# Patient Record
Sex: FEMALE | URBAN - METROPOLITAN AREA
[De-identification: names, ages, dates, MRNs, and addresses within clinical notes are randomized per-mention and may not be internally consistent; named-entity substitution may affect disease eponyms.]

---

## 2017-01-05 ASSESSMENT — ENCOUNTER SYMPTOMS
SINUS CONGESTION: 1
HEADACHES: 1
STIFFNESS: 0
TINGLING: 0
POOR WOUND HEALING: 0
POLYPHAGIA: 0
JOINT SWELLING: 1
NUMBNESS: 0
DECREASED LIBIDO: 1
LEG SWELLING: 1
DIZZINESS: 0
LOSS OF CONSCIOUSNESS: 0
MUSCLE CRAMPS: 0
BACK PAIN: 0
MYALGIAS: 0
SYNCOPE: 0
SLEEP DISTURBANCES DUE TO BREATHING: 0
ARTHRALGIAS: 1
SWOLLEN GLANDS: 0
LIGHT-HEADEDNESS: 0
DECREASED CONCENTRATION: 0
EYE WATERING: 1
LEG PAIN: 0
SEIZURES: 0
EYE IRRITATION: 0
FATIGUE: 0
EYE PAIN: 0
NECK MASS: 0
INSOMNIA: 1
EYE REDNESS: 0
POLYDIPSIA: 0
HALLUCINATIONS: 0
PARALYSIS: 0
WEAKNESS: 0
SORE THROAT: 1
HYPERTENSION: 0
HOARSE VOICE: 0
EXERCISE INTOLERANCE: 1
TROUBLE SWALLOWING: 0
ORTHOPNEA: 0
SINUS PAIN: 0
PANIC: 0
CLAUDICATION: 0
DISTURBANCES IN COORDINATION: 0
HYPOTENSION: 0
HEMATURIA: 0
WEIGHT GAIN: 0
MEMORY LOSS: 0
PALPITATIONS: 1
FLANK PAIN: 0
BRUISES/BLEEDS EASILY: 1
NERVOUS/ANXIOUS: 0
NIGHT SWEATS: 1
TREMORS: 0
HOT FLASHES: 0
SMELL DISTURBANCE: 0
DIFFICULTY URINATING: 0
TACHYCARDIA: 0
CHILLS: 1
TASTE DISTURBANCE: 0
WEIGHT LOSS: 1
SKIN CHANGES: 0
DOUBLE VISION: 0
NECK PAIN: 1
DYSURIA: 0
INCREASED ENERGY: 0
SPEECH CHANGE: 0
FEVER: 0
NAIL CHANGES: 0
ALTERED TEMPERATURE REGULATION: 1
DEPRESSION: 0
DECREASED APPETITE: 0

## 2017-01-18 ENCOUNTER — CARE COORDINATION (OUTPATIENT)
Dept: ONCOLOGY | Facility: CLINIC | Age: 52
End: 2017-01-18

## 2017-01-18 ENCOUNTER — ONCOLOGY VISIT (OUTPATIENT)
Dept: ONCOLOGY | Facility: CLINIC | Age: 52
End: 2017-01-18
Attending: INTERNAL MEDICINE
Payer: COMMERCIAL

## 2017-01-18 VITALS
TEMPERATURE: 96 F | OXYGEN SATURATION: 98 % | DIASTOLIC BLOOD PRESSURE: 70 MMHG | WEIGHT: 114 LBS | BODY MASS INDEX: 18.32 KG/M2 | RESPIRATION RATE: 12 BRPM | SYSTOLIC BLOOD PRESSURE: 107 MMHG | HEART RATE: 63 BPM | HEIGHT: 66 IN

## 2017-01-18 DIAGNOSIS — T78.3XXS ANGIOEDEMA, SEQUELA: ICD-10-CM

## 2017-01-18 DIAGNOSIS — J01.91 ACUTE RECURRENT SINUSITIS, UNSPECIFIED LOCATION: ICD-10-CM

## 2017-01-18 DIAGNOSIS — A69.20 LYME DISEASE: ICD-10-CM

## 2017-01-18 DIAGNOSIS — L50.1 CHRONIC IDIOPATHIC URTICARIA: ICD-10-CM

## 2017-01-18 DIAGNOSIS — Z86.19: ICD-10-CM

## 2017-01-18 DIAGNOSIS — T56.0X1S: Primary | ICD-10-CM

## 2017-01-18 DIAGNOSIS — Z77.018 EXPOSURE TO MERCURY: ICD-10-CM

## 2017-01-18 PROCEDURE — 99354 ZZC PROLONGED SERV,OFFICE,1ST HR: CPT | Mod: ZP | Performed by: INTERNAL MEDICINE

## 2017-01-18 PROCEDURE — 99205 OFFICE O/P NEW HI 60 MIN: CPT | Mod: ZP | Performed by: INTERNAL MEDICINE

## 2017-01-18 PROCEDURE — 86666 EHRLICHIA ANTIBODY: CPT | Performed by: INTERNAL MEDICINE

## 2017-01-18 PROCEDURE — 86316 IMMUNOASSAY TUMOR OTHER: CPT | Performed by: INTERNAL MEDICINE

## 2017-01-18 PROCEDURE — 99212 OFFICE O/P EST SF 10 MIN: CPT

## 2017-01-18 PROCEDURE — 87798 DETECT AGENT NOS DNA AMP: CPT | Performed by: INTERNAL MEDICINE

## 2017-01-18 ASSESSMENT — PAIN SCALES - GENERAL: PAINLEVEL: NO PAIN (0)

## 2017-01-18 NOTE — PROGRESS NOTES
"Patient: Argenis Adlana   (MRN [id number])   Encounter Date: 2017  Referring: Sheryl Leventhal, M.D. ([facility], [address] [place] [address], [phone], fax [phone])  Attending: Baudilio Avila M.D.     Chief Complaint/Reason for Referral: Second opinion regarding possible mast cell activation disease (MCAD).    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  At the beginning of the encounter, I reviewed all available chart data, consisting principally of about 200 pages of outside hardcopy medical records, the salient points of which I've incorporated into the narrative below.  The history here is a bit complex, and it's probably best to just present it all chronologically, blending HPI and PMH as follows.  Her history of multisystem polymorbidity of generally inflammatory and allergic themes is virtually lifelong, as she recalls problems dating to her earliest memories with constipation and unusual lethargy relative to her peers, followed around age 7 by onset of vaginal \"yeast infections\" which have frequently plagued her ever since (though often with curiously negative microbiologic findings).  Also around age 7 she began suffering episodes, usually post-prandial, of acute abdominal distention (\"like I'm suddenly pregnant\"), and these episodes, too, have continued through the rest of her life.  She recalls being taken by her father to the ER at least three times between 7 and 12 for this and each time being told, after an unrevealing evaluation, that the problem must be psychosomatic.  She knows she has been anemic ever since some point in childhood; she says no treatment has ever helped this.  She recalls that around age 9 problems with asthma began " "emerging.  Her lifelong problems with frequent episodes of sinusitis emerged around age 10.  Menarche came at 14 and was unremarkable, but around age 15, soon after she joined the swim team, she had to quit because chlorine exposure was causing rashes and sinusitis.  All of these problems continued, relatively stable, through the rest of adolescence and her 20s and 30s, including recurrent episodes of sinusitis and vaginal yeast infections.  Her first pregnancy came at 38 and proceeded smoothly.  Post-partum, she found herself perpetually exhausted and attributed this to the colicky baby as well as taking care of two young children that came to her marriage by her .  Due to thrush at the nipple and what sounds like a resulting painful candidal mastitis, she was unable to breast-feed.  She says the pregnancy was really the defining event in her life, as she has never again felt well since the pregnancy.  Since delivery she began experiencing frequent problems (every 1-3 months) with sinusitis, rashes, swelling of the extremities and tongue, alopecia, migraines, limited mobility and pain migrating about the bilateral neck/shoulders, and ongoing exhaustion.  She says six testings for celiac disease were negative, but then she went to a different specialist and underwent some sort of \"special\" blood and stool testing and was finally diagnosed with celiac disease.  She went gluten-free and saw essentially complete remission of sinusitis, yeast infections, anal sores, and neck/shoulder pain, but hand swelling and sores and alopecia continued.  She found \"high mold\" in her body, had the air ducts in her home cleaned in early '16, then found an elevated mercury level and underwent some sort of a detoxification process for this, too.  She also says she contracted an Ehrlichia infection from a tick from the woods near her [place], but this was treated with antibiotics.  However, \"despite seeing 45 doctors,\" she continued " "to feel generally unwell.  In her own research, she determined that she might have MCAD, leading to the present evaluation.    On a full review of systems, although she denies any issues with anorexia, problems with ears or hearing, epistaxis, easy bleeding, intranasal sores, dysphagia, dyspnea, chest pain/discomfort, nauesa, vomiting, diarrhea, syncope, or mental health problems, she endorses a wide range of other, chronic/recurrent, episodic/intermittent and/or waxing/waning issues including subjective (rarely objective) fevers, flushing, feeling cold much of the time, fatigue (often to the point of exhaustion), malaise, headaches, diffusely migratory aching/pain, diffusely migratory pruritus, unprovoked soaking sweats, Raynaud's phenomenon, lactose intolerance, 8 pound weight loss on a salicylate-free diet recently, irritation of the eyes, acute brief inability to focus vision, easy bruising, sinonasal congestion, coryza, post-nasal drip, mouth sores, throat soreness, Jailyn-Parkinson-White (WPW) syndrome diagnosed at 47, occasional gastroesophageal reflux (during pregnancy and when off her salicylate-free diet), constipation since childhood, diffusely migratory abdominal discomfort including (usually post-prandial) bloating, urinary frequency and incontinence (curiously improved with her salicylate-free diet of late), diffusely migratory weakness, diffusely migratory edema (generally about the distal extremities), tingling (typically about the scalp), diffusely migratory bilateral cervical adenopathy and adenitis, occasional orthostatic and non-orthostatic presyncope in her 20s, cognitive dysfunction (particularly memory, concentration, and word-finding), hair loss, deterioration of dentition (and \"sensitivity\" to various toothpastes) despite good attention to dental hygiene, brittle nails, diffusely migratory rashes (typically patchy macular erythema), hives, insomnia, limited neck mobility, poor healing in " general, and dry skin.  Complete ROS o/w neg.    Family history is notable for a son with celiac disease, WPW, lactose intolerance, and attention deficit hyperactivity disorder, a brother who survived prostate cancer, a father who  at 60 of prostate cancer, a paternal aunt who survived breast cancer at 60, and a mother who had skin cancer and who underwent a hysterectomy in her early 30s for unknown reasons.  The patient denies any history of smoking, significant alcohol use (in fact, she is intolerant of it, with one sip causing marked leg weakness and mouth sores), or illegal substance use.    She is on no regular medications and says she reacts adversely to essentially every medication she tries.  She lists her current allergies as abdominal distention, diarrhea, and gassiness to lactose, vomiting and migraines to monosodium glutamate, swelling and diffuse flushing to sulfa, and an unknown reaction in infancy to tetracycline.    Exam finds a trim, outwardly healthy appearing woman in no apparent distress, pleasant, cooperative, fully alert and oriented, easily independently ambulatory, presenting by herself.  Vitals per chart, utterly unremarkable (weight 114 pounds).  Key findings are her healthy, comfortable general appearance, HEENT benign but for a number of dental fillings, no plethora/pallor/diaphoresis/jaundice/rash/bleeding/bruising, neck supple, no JVD or thyromegaly or carotid bruits, no palpable adenopathy or tenderness at any of the usual node-bearing sites except for a single very tiny non-tender mobile rubbery shotty right inguinal node, clear lungs, regular heart (borderline bradycardic) with no adventitious sounds, abdomen benign, no peripheral edema except for mild edema and erythema (mostly periungually, worse on the right hand than the left, which she said was her usual pattern) in the bilateral fingers, neuro grossly intact.  On a light scratch test on the upper back, mildly bright  dermatographism (erythroderma only, no hives) quickly emerged and was only growing more intense when last checked 10 minutes later.    Review of available lab data was notable for mostly normal routine blood counts and chemistries (occasional minimal normocytic anemia, occasional minimal transaminitis) and elevated blood mercury and lead levels at one point around '12, though repeat testing more recently showed lower levels of both (in fact, the lead level had reduced to normal).  A wide range of other micronutrient testing was generally normal, as were thyroid function tests.  CK normal.  Ammonia normal.  C4a high at 2250 ng/ml (normal 0-650).  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  Except for a mildly elevated HDL, a lipid panel was normal.  The only mast cell  testing I could find was a normal tryptase and chromogranin A and a normal 24-hour urinary histamine.  The patient says that none of the blood or urine samples for mast cell  testing was ever kept chilled.    A/P: Dulces to the patient, because I think she's likely right in her suspicion that a mast cell activation disorder (MCAD) -- and far more likely the far more prevalent (if only recently recognized) type termed mast cell activation syndrome (MCAS) than the rare (but long recognized) type termed mastocytosis -- is at the root of most or all of her chronic multisystem polymorbidity of generally inflammatory and allergic themes. Beyond all the other diseases already ruled out by the great extent of testing she's undergone to date, I don't know of any other human disease that comes anywhere near as close as MCAS does to accounting, directly or indirectly, for the full range and chronicity of all the symptoms and findings here. (Of note, too, I'm not saying that any of her prior diagnoses are wrong (except for any  "assertions of psychosomatism that might have been made along the way). It's just that each of them accounts for only one subset or another of all that's gone on in her, while MCAS can account for most or all of everything that's been going on in her.) All of that said, she needs objective laboratory evidence of improperly behaving mast cells (note it's possible the results from the labs on her unchilled 24-hour urine sample might be false negatives), toward which end I ordered the range of testing I typically check in assessing for MCAS, namely, a CBCD, CMP, magnesium, PT/PTT, chilled serum tryptase and chromogranin A, chilled plasma prostaglandin D2 and histamine and heparin, chilled random urinary prostaglandin D2 and N-methylhistamine and leukotriene E4 and 2,3-dinor 11-beta-prostaglandin-F2-alpha, and chilled 24-hour urinary prostaglandin D2 and N-methylhistamine and leukotriene E4 and 2,3-dinor 11-beta-prostaglandin-F2-alpha. I also ordered an anti-IgE IgG and an anti-IgE receptor antibody, which won't be useful diagnostically but may influence certain therapeutic decisions down the road if MCAS is proven. I also ordered mercury and lead levels in blood and urine specimens (spot and 24-hour), and I ordered Ehrlichiosis serologies and DNA load by PCR.  In view of her chronic \"infections\" (which I suspect were often just flares of sterile inflammation), I also ordered a quantitative immunoglobulin profile.  We confirmed she has abstained from confounding use of proton pump inhibitors (PPIs) and non-steroidal anti-inflammatory drugs (NSAIDs) for the prior 5+ days. We provided her careful written and verbal instructions regarding the 24-hour urine collection including the importance of continuous chilling of the specimen throughout collection and transport.  She understands the various reasons why a single round of  testing might yield all negative results, and she understands that if this happens, it of " "course doesn't even begin to invalidate/refute/negate even a single element of her history (which strongly argues for a mast cell disorder). Her history is what it is, so if we get a negative round of testing, I'll simply recommend repeat testing (which she'll of course need to pursue locally), albeit with specimen collection at that point preferably deferred to a particularly symptomatic day. If 2-3 rounds of blood and urine testing yields all negative results, the \"backup plan\" will be to pursue a fresh set of bidirectional endoscopies to obtain biopsies throughout the luminal GI tract for pathologic evaluation specifically (using  staining at a minimum) for increased (>20/hpf) numbers of mast cells (as often seen mildly-moderately in MCAS, though not to anywhere near the degree (or patterns) seen in mastocytosis).    Note that all of the above having been said, at the end of the visit she still was not sure she wanted to pursue testing here given that her [place]-based insurance would not cover out-of-state testing.  She said she would think further about it.  I noted to her that most of this testing likely could be performed via (properly educated) laboratory facilities in [place] except for prostaglandin D2 testing, which I have heard is banned in [place] for reasons I have never been able to discover.    Thus, at the time we parted company, it still was not clear to me whether she would pursue testing here.  If testing is performed locally instead, I strongly recommend the ordering physician discuss the testing in advance with the manager of the accessioning lab.  Specimens should be collected in pre-chilled containers and kept continuously on ice, and a refrigerated centrifuge should be used for all spins.  Specimens should be packed for transport in heavily insulated boxes with plenty of cold packs thrown in.  An express delivery service should be used to deliver the specimens to the performing " laboratories as early the next day as possible.    Although I cautioned her that she doesn't have a definitive diagnosis of MCAS yet, we did engage in extended discussion today about general aspects of MCAS including its essential nature of chronic multisystem polymorbidity of a generally inflammatory theme, the availability of many therapies shown helpful in various MCAD/MCAS patients, the good likelihood of (eventually) finding therapy that will help her achieve the goal of feeling significantly better than the pre-treatment baseline the majority of the time, and the present frustrating absence of any biomarkers that can help predict which of the many available, potentially helpful therapies is most likely to benefit the individual patient. She understands that if we are able to secure a diagnosis of MCAS, she will be dependent on pursuing -- in patient, persistent, and methodical fashion, trying as hard as possible to make just one change at a time -- trials of the various therapies (which, again, lacking predictive biomarkers, we generally pursue in order of escalating cost). She understands that once all test results are available about a month from now, I will write an addendum to this note (to again be distributed to all of her physicians listed below) providing my interpretation of the results and recommendations for next steps.    I told her that the only therapy I am willing to empirically recommend in a patient with suspected, but not yet proven, MCAS is a full (H1 + H2) histamine receptor blockade (e.g., loratadine 10 mg bid + famotidine 20 mg bid).  She understands that some patients find that alternative H1 or H2 blockers serve them better, some patients find that tid dosing serves them better than bid dosing, and some patients find that slightly higher dosages (e.g., 20-30 mg rather than 10 mg of cetirizine, or 150 mg rather than 75 mg of ranitidine) serve them better than lower dosages. She will need  "to \"experiment,\" taking 2-4 weeks with each specific combination of drug/dose/frequency to understand what it can accomplish for her in controlling this disease of naturally waxing/waning intensity. I also cautioned her that MCAD/MCAS patients have quite a predilection to adversely react not necessarily to the active ingredients in their medications but rather to the excipients (fillers, binders, dyes, preservatives, etc.) in their medications. As such, if she experiences an adverse reaction very shortly after beginning an ordinarily well tolerated medication (as is certainly the case with the H1 and H2 blockers), excipient reactivity must be suspected and she should promptly work with her pharmacist to review the medication's ingredient list, try to identify the likely offending ingredient, and try one or more alternative formulations of the medication which don't share any of the offending formulation's excipients. She appears to understand.    She lives far too distantly from here to return with any significant frequency.  She understands she will remain 100% dependent on her local providers for management of all acute and chronic aspects of the disease.  I'll see her roughly annually for \"strategic reassessments.\"    As is usually the case with initial consultations for mast cell disease, this was a long encounter, 100 minutes in total, with 60 minutes spent in counseling. The patient indicated that all of her questions at this time had been answered to her satisfaction, and she expressed appreciation for the time I had given her.      Typed, reviewed, and electronically signed by: Baudilio Avila M.D.     DT: 01/18/2017 08:56 PM    cc: 1. Sheryl Leventhal, M.D. ([facility], [address] [place] [address], [phone], fax [phone])  2. Please also mail a copy to the patient at her home address as listed in the system.      Addendum (2/11/17): Calista again to the " patient, for she has been proven right: labs are back and are diagnostic.  As is commonly seen in this work-up which necessarily casts a fairly wide net over the range of clinically testable mediators which are relatively specific to the mast cell, most of the tests were normal, but the mast cell  levels detailed below were positive, and she also has an elevated anti-IgE-receptor antibody level at 30% (normal <13%).  Lead and mercury levels in blood and urine were normal, and anti-Ehrlichia and anti-Anaplasma antibodies (IgG and IgM) were completely negative, strongly suggesting she has never had an Ehrlichia infection.    Therefore, based on (1) a clinical history highly consistent with chronic/recurrent aberrant mast cell  release, (2) multiple elevated mast cell  levels in 1/17 (24-hour and random urinary N-methylhistamine both mildly elevated (on separate days) at 207 and 212 mcg/g Cr, respectively (normal ), and serum chromogranin A mildly-moderately elevated at 141 ng/ml (normal 0-95, and without any of the known confounders of heart or renal failure, recent PPI use, or evident neuroendocrine cancer)) (but a repeatedly normal serum tryptase, largely ruling out systemic mastocytosis), and (3) absence of any other evident disease better accounting for the full range and chronicity of all the symptoms and findings in the case, mast cell activation syndrome (MCAS; not systemic mastocytosis) is the underlying, unifying diagnosis (per Sanjiv et al., J Hematol Oncol 2011) for the patient's virtually lifelong complex of multisystem polymorbidity of generally inflammatory and allergic themes. Of note, again, I don't think any of her prior diagnoses are wrong (other than any assertions of psychosomatism that might have been made along the way), but the diagnosis that seems to best underlie/unify the largest portion (potentially even all) of her large array of past and present issues is  "MCAS, and therefore treatment efforts directed at such would seem to be warranted.  To be sure, all of her physicians must maintain vigilance for other processes for which -- again, whether ultimately dependent on, or completely independent of, her MCAS -- standard therapies other than mast-cell-directed therapy have been defined, as such standard therapies would of course be the more appropriate choices for such processes.  However, with MCAS now having been defined in this patient per published diagnostic criteria, emergence of a new process that is potentially consistent with MCAS can be reasonably attributed to MCAS once other \"routine\" evaluation for that process has ruled out other, \"traditional\" causes for such a process.    I'd like to briefly address just a bit more why this is MCAS and not mastocytosis. First of all, I saw no skin lesions suggestive of cutaneous mastocytosis, and her history of symptoms consistent with aberrant mast cell  release dates back to childhood (as typically seen in MCAS, in my experience now across more than 1000 MCAS patients) rather than the de koko presentation in middle or older age usually seen with systemic mastocytosis or the classic presentations of urticaria pigmentosa typically seen in childhood. Her normal tryptase, too, is far more consistent with MCAS and makes systemic mastocytosis (SM) quite unlikely (not impossible, but quite unlikely). The rare normal-tryptase (or minimally-elevated-tryptase) SM virtually always is the indolent form of SM (ISM), and to the best of our present knowledge, there's no difference in the prognosis of, or the therapeutic approach toward, ISM vs. MCAS. Therefore, even if we're missing the uncommon normal-tryptase ISM by not pursuing more biopsies of various extracutaneous tissues, there would seem to be nothing lost by \"misdiagnosing\" her with MCAS. (There certainly are no clinical or laboratory features here to suggest a " comorbid hematologic malignancy.) I'll note, too, that transformation of ISM to aggressive SM (ASM) is rare, and transformation of MCAS to any form of SM has in fact never been reported yet in the nine years since the first case reports of MCAS were published in '07.    As such, we can reasonably confidently exclude mastocytosis as the issue here and I don't think marrow examination is warranted, and until somebody figures out another diagnosis that even *better* accounts for all that has gone on in her, it seems reasonable to go with MCAS as the best root operating diagnosis here.    What I'd like to do at this point in the discussion is provide a range of general information about MCAS and its therapy.    I will note that it's of course possible that the mast cell activation found in her is purely secondary (to either her anti-IgE-receptor antibody and/or to some unknown other chronic inflammatory disease) rather than primary (mutational) -- it will require mutational analysis that won't be available in the clinical laboratory for at least another 5-10 years before such a distinction can be routinely made -- but I will assert, again, that she meets all current diagnostic criteria for MCAS and MCAS is the best unifying explanation at present for her large assortment of problems. As treatments for other (less than unifying) diagnoses have generally not yielded substantial clinical improvement to date, and since a diagnosis of MCAS has now been made per current diagnostic criteria, it would seem that treatment efforts directed at MCAS are warranted (presuming she is less than wholly satisfied with the gains she has made with her present regimen).    I feel it's important to comment on how a normal tryptase is not necessarily inconsistent with mast cell disease. Since its discovery in the 1980s, tryptase has been inexorably linked with mast cell disease and generations of physicians have been trained that it is  "virtually impossible to have mast cell disease unless serum tryptase is elevated -- but we now know this precept to be incorrect. Although initial studies of tryptase led to thinking its level reflected the total body mast cell activation state, in the last decade there has been a sea change in this understanding and now it is clearly understood (and even publicly acknowledged by tryptase's discoverer) that the level of tryptase far dominantly reflects the total number of mast cells in the body and far less the total body mast cell activation state. As such, one would expect to find the significantly increased tryptase level typically found in >80% of systemic mastocytosis patients, while in MCAS (where there is little to no increase in the numbers of mast cells) one would expect to find little to no increase in the tryptase level, and when no increase can be found in tryptase, one typically has to find evidence of mast cell activation by examining other relatively specific mast cell mediators, a tricky business given their typically very short half-lives and great thermolability.    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    Although for many reasons a unifying diagnosis of a mast cell disorder typically isn't suspected until decades after symptom onset, the chronic multisystem polymorbidity of a generally inflammatory   allergic theme that is MCAS tends to initially emerge by adolescence or early adulthood but often happens as early as childhood or even infancy, this last a scenario in which the rare inborn autoinflammatory syndromes (AISs) need to be considered in the differential diagnosis, especially since (1) recent molecular investigations in the AIS area have been discovering that some of them actually are specific variants of MCAS and (2) highly specific (and effective) drugs exist for some of these syndromes. MCAS tends to \"step up\" its " "baseline symptoms at irregular intervals, generally shortly after a major (physical and/or psychological) stressor (e.g., trauma, major infection, surgery, distant travel with novel antigenic exposure, job loss or other severe financial strain, death of a loved one, etc. etc. etc.). In the absence of formal study yet of this issue, present best estimates of survival in MCAS are for a normal lifespan (akin to what's been shown in studies of indolent SM) and that a wide variety of medications have been shown effective in various MCAS patients. Although there appears to be a very low rate of transformation of indolent SM to more aggressive forms of SM, there has not yet been a single reported case (nor I have observed or heard of a single case) of MCAS transforming to any form of mastocytosis. At present, with no objective markers of therapeutic response having been developed yet (and which may be quite difficult to develop given the extreme heterogeneity of the clinical presentation of the disease), the goal of therapy is entirely subjective, namely, feeling significantly better than the pre-treatment baseline the majority of the time. Feeling \"perfect\" or feeling significantly better \"all the time\" is not a reasonable goal given the complexity of the disease (see http://[email or web address].com/index.php/page/copelibrary?key=mast%20cells as one illustration of this point). Most MCAS patients are able to eventually identify significantly helpful therapy, but at present there are no published/validated biomarkers that can predict which therapies are most likely to benefit a given patient. As such, both patient and doctor must practice patience, persistence, and a methodical approach -- trying as hard as possible to make just one change in the regimen at a time -- in stepping through trials of various medications (generally proceeding in order of increasing cost given that no better scientifically informed strategy is " presently available), retaining treatments which clearly provide significant benefit (which for most medications in this area can be determined within 1-2 months) and decisively stopping those which do not meet this high bar, lest unmanageable polypharmacy develop.    In my opinion, her early history was not burdened with multisystem inflammatory issues nearly as much as one typically sees in classic autoinflammatory syndromes, so I don't think it would be reasonable to pursue genetic testing for such at this time.  Of course, if she proves refractory to a number of MCAS-targeted therapies, the utility of such testing might then be increased.  Initial evaluation by a genetic counselor usually smooths the way toward insurance coverage of the testing for these conditions (e.g., the periodic fever syndrome 7-gene sequencing panel (code PRFEVERPAN) at [facility] in [place] ([email or web address]) or the similar (periodic fever syndrome) 21-gene sequencing panel at [facility] ([email or web address])).    I think her normal plasma histamine level and the relatively modest elevations in her urinary N-methylhistamine levels make it pretty unlikely there's a deficiency in diamine oxidase (JULIETA) or a poor-metabolizing mutation of histidine N-methyltransferase (HNMT) here, and thus I think testing for JULIETA deficiency and mutational analysis of HNMT is not warranted.  Nevertheless, if these tests are desired, JULIETA deficiency testing can be pursued via [facility] in [place], [place] ([email or web address]), and HNMT mutational analysis can be ordered as a single-gene analysis at various facilities (e.g., [facility] in [place], [place] ([email or web address])).    Current understanding of the genetics in MCAS is limited. Repeated preliminary datasets from the [place] [facility] demonstrate the disease is clonal in essentially every patient, albeit vastly heterogeneously so, thereby likely  "explaining the vast clinical heterogeneity (and vastly heterogeneous therapeutic responsiveness) with which the disease presents. The [facility] team has also provided repeated preliminary datasets demonstrating the disease is likely epidemic (present in at least 5-10% of the general [place] population), unsurprising given increasing data suggesting various epidemic chronic idiopathic inflammatory diseases (e.g., chronic fatigue syndrome, fibromyalgia, irritable bowel syndrome) may well be merely assorted variants of MCAS. Other not-so-obviously-inflammatory (but nevertheless still likely inflammatory) diseases, too, may be assorted variants of MCAS, such as chronic idiopathic urticaria, idiopathic anaphylaxis, Daniela Danlos Syndrome Type III, postural orthostatic tachycardia syndrome (POTS), dysautonomia, autism, attention deficit hyperactivity disorder, etc. etc. etc. However, to be clear, none of these diseases has yet been proven to be forms of MCAS, and much research into this hypothesis is needed in each of these diseases. Furthermore, the [facility] team has clearly demonstrated the high familial predisposition of the disease in spite of the fact that the  mutations appear virtually always somatic/acquired (i.e., not germline/inherited), and relatively early in life at that. (It is known that the mutations driving aberrant MC function in any given affected patient in an affected jonathon are different from the mutations in other affected members of the affected jonathon, explaining why the different affected members of an affected jonathon manifest somewhat different clinical presentations. The epigenetic effect of \"anticipation\" is also seen in mast cell disease kindreds, with later generations first manifesting the disease at earlier ages and with overall more severe phenotypes in later generations.) Preliminary data are just beginning to emerge that the reconciliation of these two superficially conflicting " "observations (familial predisposition vs. the somatic nature of the causative mutations) stems from recognition that most MCAS (and SM) patients also bear (inheritable) epigenetic mutations, and it is currently hypothesized that these epigenetic mutations create states of genetic fragility such that assorted biochemical signal patterns which flood the body in response to assorted (physical or psychological) stressors interact with such fragility states to create the actual genetic mutations in marrow stem cells or pluripotent progenitor cells which then \"trickle down\" to mast cells (and, to be sure, other lineages, but when the end clinical effects of such mutations are dominantly operant in the mast cell as opposed to other types of cells, it is reasonable to term the situation a mast cell activation syndrome). These genetic mutations then create states of not only constitutive mast cell activation but also aberrant mast cell reactivity, and the end clinical effects seen in any given MCAS patient are the net results of extremely complex networks of interactions among abnormal (and normal) MCs and abnormal (and normal) other cells.    As previously noted, there is a large array of drugs shown helpful in various MCAS patients, but \"Step 1\" in treating any mast cell disorder -- and I cannot overemphasize the importance of this step -- is identification (as specifically as possible) and avoidance of triggers (be they chemical/antigenic or physical such as cold, heat, ultraviolet light, etc.). Note medication excipients (e.g., fillers, binders, dyes, preservatives) often are triggers, and rapid adverse reaction to an ordinarily well tolerated drug in an MCAS patient is less likely a sign of intolerance of the active ingredient and far more likely a sign of an excipient reaction mandating prompt return to the pharmacist (commercial or compounding) to identify alternative formulations to be tried which do not contain any " "of the offending formulation's excipients. Adding a drug to the allergy list is unhelpful -- indeed, frankly counterproductive -- when the true offending agent is an excipient in the particular formulations of the drug that were tried. Offending excipients should be identified as specifically as possible, added to the allergy list, screened against the rest of the patient's present medication list, and screened against all medications prescribed in the future.    I feel I should explicitly note here that the patient's history of reacting adversely to virtually every medication product she has ever tried is very strongly suggestive of reactivity directed against excipients, not the drug molecules themselves.  Again, if the patient quickly reacts adversely to an ordinarily well tolerated medication, excipient reactivity must be suspected and it becomes imperative for the patient work with the pharmacist to first review the product's full ingredient list to try to identify the offending excipient and to then try alternative formulations of the medication not containing the suspected offending excipient(s).    \"Step 2\" in treating MCAS ordinarily is identifying an optimal full (H1 + H2) histamine receptor blockade as discussed in my initial note above.  Most MCAS patients do much better with histamine receptor blockers given at least twice daily rather than once daily; some do even better with tid dosing rather than bid dosing. Some patients clearly do better with one particular H1 blocker compared to another, or one particular H2 blocker compared to another. Thus, experimentation with different H1 and H2 blockers is reasonable to try to identify a particular optimal regimen. With the antihistamines, it usually takes only 2-4 weeks with each particular H1 blocker (at any given dose/frequency) or H2 blocker to identify (across the natural hourly/daily/weekly waxing/waning of symptoms from inappropriate mast cell " activation) the benefits and toxicities of that specific regimen, allowing a decision at that point as to which dosing (or medication) change should be tried next.  These drugs ordinarily are so well tolerated that if adverse reactions rapidly emerge, excipient reactivity is far more likely than reactivity against the drug molecule itself. In the [place] non-sedating H1 blockers that are commonly tried are loratadine (starting at 10 mg bid), cetirizine (starting at 10 mg bid), fexofenadine (starting at  mg bid), or levocetirizine (starting at 5 mg bid). H2 blockers that are commonly tried are famotidine (20-40 mg bid) and ranitidine ( mg bid); in much of [place] and certain other regions, rupatadine has been demonstrated to be a useful H1 blocker in mast cell disease. Through cautious experimentation, some MCAS patients discover that higher individual dosages (e.g., loratadine 20-30 mg instead of 10 mg) or higher dosing frequencies (e.g., tid instead of bid) bring them significantly greater benefit than lower dosages or frequencies. The patient who is taking too much H1 blocker almost always discovers that dosing is excessive when the typical anticholinergic toxicities are noted: newly (or significantly worse) dry eyes, dry sinuses, dry mouth, dry throat, urinary hesitancy, and/or constipation.    A few MCAS patients are so severely afflicted as to be in an essentially constant anaphylactoid state. Although I don't think this patient is anywhere close to the point of needing this treatment, I will note -- merely for *potential* future use in this patient, should she advance to the point of suffering severe, chronically life-threatening and/or disabling disease proving refractory to a large number of other treatments -- that I have had success in some (now more than two dozen) very severely afflicted MCAS patients with continuous infusion of (preferably preservative-free) diphenhydramine. I typically start  dosing (inpatient, to permit close monitoring) at 5 mg/hr and escalate in increments of 1-2 mg/hr with each flare of symptoms. Dosing above 15 mg/hr is likely to be futile and toxic, but I have now seen (though not yet had opportunity to publish beyond abstract form) excellent responses in the large majority (~90%) of a bit more than two dozen patients in whom I've now tried this, and all the responses have occurred in the 10-14 mg/hr range. Obviously, a semipermanent IV line (typically PICC, PASport, or Portacath) needs to be placed prior to discharge if this treatment is found helpful; note that some patients react to the materials in some lines, so sometimes more than one line needs to be tried. Even once an optimal chronic maintenance dose is identified, patients may continue to have occasional flares, for which bolus dosings of 10-25 mg via the pump are usually sufficient to regain control. Of note, in one patient in whom the infusion of preservative-free IV diphenhydramine seemed to trigger flares and who could not tolerate the infusion at more than 8 mg/hr, a trial of a different 's preservative-free IV diphenhydramine instantly resolved the intolerability and resulted in good response within the expected dosing window, thus demonstrating there had to have been an unacknowledged excipient or unrecognized contaminant in the first product tried; indeed, I then discovered at the FDA's website that the first product's  (JOEY) had been repeatedly recently cited by the FDA for contaminated product and inactive product in some of its other IV products and also had been cited for failing to respond to prior citations. (I have since gained similar experience with the allegedly preservative-free IV diphenhydramine product from [facility], too. In all of these few, severely afflicted patients who failed JOEY and [place] IV diphenhydramine, a trial of similar product from [facility] then proved  successful.) In other words, although it is as theoretically possible to develop true allergy to diphenhydramine as to any other medication, ordinarily diphenhydramine is an extremely well tolerated drug, and thus even in a formulation which supposedly contains absolutely nothing but diphenhydramine and water, intolerance should raise more concern for excipient reactivity than diphenhydramine reactivity.    I will further note that I also have (unpublished, limited) clinical experience that addition of continuous famotidine infusion (2.5 mg/hr) to continuous diphenhydramine infusion (CDI) can provide clinically significant further disease control beyond what is seen in some patients with CDI together with intermittent (oral or IV) histamine H2 receptor blocker therapy.  Still, if it is ever determined that CDI is necessary in this patient, I would recommend starting it first and establishing a stable, beneficial dose before adding continuous famotidine (or ranitidine), so that the different benefits from the different infusions can be distinguished.    Once an optimal full histamine receptor blockade has been established (presuming such drugs help at all; note that the heterogeneity of the disease is such that one can't presume *any* mast-cell-targeted medication or medication class to necessarily prove effective in all MCAS patients), the question becomes what to try next. Given the lack of validated biomarkers at present to predict optimal therapy for the individual MCAS patient (see the note in the following paragraph for a bit more discussion on this important point), I tend to start inexpensively and progress by cost as needed (though with occasional, carefully considered exceptions as noted below). Whenever possible, one intervention should be tried at a time, generally starting at a low dose and escalating step-wise in dose or frequency about every 1-2 weeks as tolerated so that a maximally tolerated dose and  "a maximally effective dose and frequency can be clearly identified. If the intervention is tolerated but significant benefit is not clearly identified after 4-8 weeks, the drug should be dropped and the patient should proceed to the next trial. When the medication that is significantly effective for a patient's particular variant of mast cell disease is found, the improvement is often so starkly apparent to the physician as he walks into the exam room at the next check-up in 1-2 months that sometimes words do not even need to be exchanged.    There is an understandable temptation to expect that elevated prostaglandins should rona for likelihood to respond to NSAIDs, or that elevated leukotrienes should rona for likelihood to respond to leukotriene receptor antagonists, or that elevated histamine or histamine metabolites should rona for likelihood to respond to histamine receptor antagonists and/or diamine oxidase, etc. etc. etc., but the reality is that at present there simply are no data demonstrating such relationships. The mast cell is capable of producing and releasing more than 200 mediators, and the few we measure obviously are a very poor surrogate for the totality of the signalling chaos in the disease, plus, as noted above, there are preliminary data suggesting extreme mutational heterogeneity in multiple mast cell regulatory elements in essentially every patient with the disease, all but guaranteeing extreme heterogeneity in patterns of clinical presentation and patterns of therapeutic responsiveness.  Thus, while there's certainly nothing wrong in trying, for example, a leukotriene blocker as an early intervention in an MCAS patient with elevated leukotrienes, one should not draw any inferences at all (about, for example, the accuracy of the diagnosis) if the patient fails to respond to such \"logical\" therapy. The disease is simply far too complex for expectations of response to be that simple. Similarly, " "I should emphasize that the anecdotal observations I offer below (e.g., patients with diffusely migratory pain, especially bone pain in the legs, tend to respond to hydroxyurea, and patients with inappropriately \"normal,\" or even mildly elevated, H/H tend to respond to imatinib) are just that -- anecdotal -- and have not yet been published or otherwise subject to peer review, let alone put through formal evaluation to establish them as \"validated biomarkers.\" All I can offer at this point is my accumulated clinical experience in treating more than 1,000 MCAD patients (the vast majority with MCAS). This certainly will be important research to be done as funding for such can be obtained, but the bottom line at present is that research has not been done, so the physician treating an MCAS patient needs to keep in mind the great limitations on what's currently *reliably* known about MCAS.    On a matter that's different from, but nevertheless somewhat related to, the excipient issue, I should note that if the patient has any known drug-metabolizing-enzyme (DME) polymorphisms (e.g., in cytochrome p450 isozymes 2C9, 2C19, 2D6, or 3A4, all of which can be genotypically tested quite readily in the [place] dosing adjustments will be needed as appropriate for the patient's particular polymorphisms. Like MCAS itself, pharmacogenomic polymorphisms are far more prevalent (by available epidemiologic data) than physicians typically suspect. A poor-metabolizing DME polymorphism should be suspected (and the patient should be appropriately genotyped) if, after the patient has had some time on the drug at routine doses, a toxicity develops which is typically seen only at high doses of that drug; conversely, a rapid-metabolizing DME polymorphism should be suspected (and, again, the patient should be appropriately genotyped) if an ordinarily very reliable drug effect (e.g., INR increase with warfarin) is not seen with typical dosing. It " "is unknown whether there is a relationship between the genetic/epigenetic origins of mast cell disease and the genetic basis of DME polymorphisms, but I certainly have seen many DME polymorphisms in MCAS patients.    Significantly beneficial drugs obviously should be retained in the regimen beyond the trial period. There are no biomarkers at present to identify when the disease has come \"adequately\" under control. It is purely the patient's subjective judgment as to whether sufficient improvement has been attained to preclude, at least for the time being, further medication trials, or not. The goal in this very complex disease is to identify a regimen that helps the patient feel significantly better than the pre-treatment baseline the majority of the time, and with sufficient patience, persistence, and a methodical approach (trying as hard as possible to make just one change in the regimen at a time) exercised by both patient and local treating physician, in my experience most MCAS patients have been able to attain the goal.    Other inexpensive agents to be considered include potentially sedating H1 blockers (e.g., hydroxyzine, doxepin, cyproheptadine, clemastine), NSAIDs (note caveats below), quercetin or other flavonoids, alpha lipoic acid, N-acetylcysteine, vitamin C, vitamin D, benzodiazepines, and even amphetamines and low-dose naltrexone; JULIETA supplements, too, occasionally provide some help.  More expensive agents include leukotriene inhibitors, ketotifen (this is more expensive only in the [place] it usually is very inexpensive in every other country on the planet), cromolyn, pentosan, ivabradine, dronabinol, and hydroxyurea. Like ketotifen, there is another mast-cell-stabilizing/anti-inflammatory agent, too -- tranilast -- readily available in the [place] but only available in the [place] via compounding. Substantially more expensive agents include omalizumab and tyrosine kinase inhibitors such as imatinib. I " "also have seen somatostatin occasionally prove helpful for refractory non-infectious diarrhea in MCAS.  Although there is not yet any reported use of alpha interferon in MCAS, the systemic mastocytosis literature shows that this drug can help reduce, in some patients, not only tumor load but also mast cell activation symptoms, therefore arguing for potential utility of the drug in MCAS (more comments below). Immunosuppressants such as hydroxychloroquine, azathioprine, cyclosporine, rapamycin, sirolimus, tacrolimus, mycophenolate, cyclophosphamide, etc. tend to help little but occasionally show benefit and thus are not entirely unreasonable to try; dosing is individualized, but it is reasonable to start as is typically done in other situations in which these drugs are used. There are theoretical reasons why newer targeted anti-inflammatories (e.g., infliximab, etanercept, adalimumab, etc.) and Janus kinase (NICOLA) inhibitors might be helpful in at least some cases of MCAS, but there have not yet been any reports of ad hoc use of such drugs in MCAS, let alone formal studies. Of note, too, are the (expensive) NK-1 receptor blockers (e.g., aprepitant), which are approved for refractory post-operative or chemotherapy-induced nausea and vomiting but have not yet been case-reported or formally investigated (let alone FDA-approved) in \"mast cell disease,\" yet they have been shown helpful in \"refractory pruritus\" of both malignant and benign/idiopathic etiologies, and one could be forgiven, in my opinion, for suspecting \"refractory pruritus\" is likely a manifestation of mast cell activation; it's interesting that binding of substance P ligand to the NK-1 receptor (which is known to be expressed on the mast cell surface) is known to cause explosive mast cell degranulation.  Again, there simply is no way to predict which medications are most likely to help which MCAS patients, and it is the treating physician's best judgment as " "to the specific sequence of medication trials that will best suit the individual patient. There are no standards established in this matter, and there is no \"right\" or \"wrong\" to trying one medication sooner rather than later.    I typically start a trial of doxepin in an MCAS patient at 6.25-10 mg bid, escalating weekly as tolerated (sedation is usually the limiting toxicity) in 6.25-10 mg bid steps to maximal efficacy or a maximum dose of about 40-50 mg bid. Hydroxyzine can be tried starting at 10-25 mg bid and escalating every 1-2 weeks in steps to  mg bid-tid. Cyproheptadine can be tried starting at 4 mg bid-tid and escalating every 1-2 weeks in steps; maximum dosing typically is 8 mg qid.  Clemastine (which is usually accessible over-the-counter) can be tried starting at 1.34 mg bid, escalating weekly as tolerated in steps of 1.34 mg bid to maximal efficacy or a maximum dose of 2.68 mg bid-tid.    Stimulants such as Adderall (amphetamine-dextroamphetamine) or Ritalin (methylphenidate) are occasionally helpful. Adderall can be started at 5 mg once or twice daily and escalated in steps to as high as a total daily dose of 60 mg. It probably should not be used in patients with a history of substance abuse. Ephedrine starting at 12.5 mg bid may be helpful; it can be stepped up every week or so as tolerated to as high as 150 mg daily in divided doses. Methylphenidate can be tried at 5 mg bid and escalated in steps every 1-2 weeks as tolerated to maximal efficacy or a maximum dose of 20 mg bid-tid (optimally 30-60 minutes before meals); if the drug proves helpful, long-acting (and thus potentially more convenient) formulations are available.    Narcotics often are triggers in MCAS. I try to avoid prescribing narcotics for MCAS patients as much as possible. In my experience and as reported in some literature, tramadol, fentanyl, and hydromorphone tend to be better tolerated than other narcotics.  I have very " limited anecdotal experience, too, that buprenorphine (e.g., the Butrans patch) can be well tolerated and helpful.    NSAIDs can be helpful in some mast cell disease patients but serve as triggers in others. If there is any history of NSAID intolerance, then consideration must be given to having an allergist take the patient through an NSAID desensitization protocol prior to starting a trial of NSAIDs. The NSAID most commonly used in NSAID-tolerant and -responsive MCAS patients is simple aspirin, typically beginning cautiously at 40-80 mg bid and doubling, as tolerated, approximately every 4-14 days to maximal efficacy. I would not push this past 500-650 mg bid, as I have reliably seen intolerable toxicities at total daily doses in excess of 1300 mg/d. Maintenance of 325-650 mg bid dosing requires standing GI prophylaxis in the form of either H2 blocking and/or PPI therapy. I should note, too, that I have seen MCAS patients in whom all standard COX1/COX2-blocking NSAIDs are intolerable but who then tolerate COX2-selective celecoxib (typically 100-300 mg bid) quite well and to good effect. That said, celecoxib has a sulfa moiety and traditional teaching had long been that this drug should be avoided in sulfa-allergic patients, though more recent published experience (e.g., http://www.nejm.org/doi/full/10.1056/TNHIzy640468) suggests this is an insignificant consideration and it's OK to try celecoxib in sulfa-allergic patients.    Quercetin, a flavonoid, can be obtained over-the-counter and is typically started at 250-500 mg bid and escalated in dose or frequency every 1-2 weeks as tolerated in steps of 250-500 mg bid to maximal efficacy or a maximum dose of about 1000 mg tid. If there are hints of a response, a more water-soluble (and thus better absorbed and sometimes more effective) form of this drug, quercetin chalcone, can be tried (typically at about half the dose of quercetin).    Alpha lipoic acid is typically  started at 100-300 mg bid and escalated every 1-2 weeks as tolerated to maximal efficacy or a maximum dose of about 300-600 mg bid. In my experience this has tended to benefit sensory neuropathy more than other symptoms, but I also have seen an occasional case in which it provided garnett global improvement.    N-acetylcysteine is typically started at 300-600 mg bid and escalated every 1-2 weeks as tolerated to maximal efficacy or a maximum dose of about 600-1200 mg bid. In my experience this benefits few patients (most commonly those with presyncopal issues), but sometimes its benefits are global and garnett.    Vitamin C has been repeatedly shown to inhibit mast cell production and release of histamine. It's typically dosed at 750-1000 mg in a once-daily slow/extended-release form, though I've had one patient respond well at just 500 mg once daily, and some patients report use of such a product bid helps more and appears sustainably tolerable and effective.    Although vitamin D has not yet been reported to have helped any form of human mast cell disease, I will note that a few of my MCAD/MCAS patients who have been prescribed vitamin D supplements by their other physicians to address osteopenia/osteoporosis have reported improvement (soon after beginning the supplement) in symptoms which almost certainly are driven by their MCAS, and a potential direct mechanism for such a response is clear since (normal and malignant) mast cells are known to bear cell-surface vitamin D receptors and since engagement of that receptor by vitamin D (calcitriol) clearly results in activation of various intracellular pathways that result in decreased inflammation as shown by a number of measures. As such, and again with the understanding that there are no formal studies of such, vitamin D supplementation in moderation (~1,000 IU per day) would seem to be a not unreasonable, and almost certainly non-toxic (excipient issues  "notwithstanding) and inexpensive, intervention to try in MCAS, especially in those in whom vitamin D deficiency is identified. Of note, patients who are severely deficient in vitamin D at baseline probably initially need more aggressive supplementation (e.g., ~50,000 IU weekly for 6-8 weeks) before pursuing the above-noted daily supplementation.    I have heard of mast cell disease patients who have improved with low-dose naltrexone but have seen significant improvement in only one patient thus far. Dosing typically is in the range of 1.5-6 mg/d, though some patients may find split dosing bid more helpful.    JULIETA supplements are occasionally helpful and are typically dosed in terms of histamine-degrading units (HDUs), e.g., the HistDAO product is dosed as 20,000-40,000 HDUs (1-2 capsules), preferably 15 minutes before meals, especially meals with known higher histamine content.    Speaking of meals, it should be noted that some patients find \"low-histamine diets\" (the specific nature of which seems to vary substantially from one author to the next) helpful, other patients find other diets helpful, and most patients find diets of any sort generally unhelpful.  In general, dietary interventions should be viewed as exercises attending to \"Step 1\" above, i.e., identify triggers as precisely as possible, and then avoid them.  In other words, if a high-histamine-content food such as cheese or wine is found to reliably trigger a flare of symptoms, then that food should be avoided.  All one can do with regard to diet is avoid extreme diets and consider each dietary intervention as, well, an intervention akin to a medication intervention: if it has not clearly provided significant benefit after about a month, it should be abandoned and the patient should move on to another intervention/trial.    Also with regard to dietary challenges, more severely afflicted MCAD/MCAS patients sometimes have so much difficulty tolerating a " "very wide range of foods that \"safe\" oral intake is reduced to an extremely limited dietary range, and sometimes it unfortunately becomes the case that simply no oral dietary intake at all is tolerated.  In such patients, it is important to remember that not every dysfunctional mast cell in the body of an MCAS patient is dysfunctional in the same way as every other dysfunctional mast cell in that patient's body, and it often is the case that mast cells in different sites in the patient's body -- even in different segments of a given organ/system -- will be dysfunctional in different ways or degrees.  As such, even though the mast cells in the proximal GI tract (mouth, throat, esophagus, perhaps even stomach) may be so widely reactive as to preclude tolerable ingestion of any dietary material, it remains possible that the mast cells in the small and large bowel may still tolerate dietary material and permit absorption of nutrients.  In other words, in some MCAS patients who simply can no longer eat, a PEG, PEJ, or PEG-J tube may permit continued enteral feeding, a far better approach to nutrition than parenteral nutrition (which indeed I have seen become necessary in a very few MCAS patients).  Although I have seen occasional patients unfortunately react to such tubes themselves (requiring replacement with tubes constructed of alternative materials/plastics), in my experience most MCAS patients who come to require tube feeding adequately tolerate the tube (and the placement procedure), and then the question becomes which formula to use.  I have seen a wide range of formulas prove successful -- and unsuccessful -- in MCAS patients, and only a patient, trial-and-error process will prove successful in the end (though, again, a very occasional patient proves intolerant of all available/accessible formulas and thus comes to need parenteral nutrition, with the expected much higher complication rate).  In my experience, " "Neocate Jr. and Elecare Jr. have been the most consistently (but, again, not universally) tolerable formulas.    Benzodiazepines -- typically at low doses but given regularly because of the short half-lives of most of the drugs in this class -- also can be helpful, likely because they address the inhibitory mast-cell-surface benzodiazepine receptors. (Of course, they also address similar neuronal receptors, and given the physical proximity of mast cells and neurons in many tissues and the extensive \"cross-talk\" between these two types of cells, it should not be surprising that \"calming the nerves\" can bring about a useful downregulating effect on mast cells independent of whatever direct downregulating effect such a drug might have on mast cells via the mast-cell-surface benzodiazepine receptor.) Lorazepam is a reasonable choice, and even though clonazepam clearly binds to the mast-cell-surface benzodiazepine receptor less well than lorazepam, some patients respond well to clonazepam, too, but these drugs have short half-lives, so any benefit she gains from them will wear off fairly shortly, thus justifying regular dosing if it is going to provide her as much help as possible. (It is for this pharmacokinetic reason that some [facility] who benefit from lorazepam or clonazepam clearly do better at tid dosing than bid dosing.) I typically start lorazepam or clonazepam dosing at 0.25 mg bid, escalating every 1-2 weeks as tolerated in 0.25 mg bid increments to maximal efficacy or a maximum dose of 1-1.5 mg bid-tid. An occasional patient will even do remarkably well at just 0.125 mg bid, so there's nothing wrong with starting at even that cautious a dose.  Sometimes diazepam, because of its longer half-life, comes to be identified as the preferred agent of this class in the individual patient. Midazolam is usually an excellent choice for perioperative management.    Steroids of course are inexpensive and often helpful " in settling acute flares of mast cell disease, but their long-term toxicities make them unattractive as chronic treatment. Of note, though it's virtually impossible for a steroid to be allergenic (just as with H1 and H2 blockers as discussed above), non-IV steroid injections (as given in painful joints, for example) often contain excipients and/or -michael anesthetics which indeed can be provocative to MCAS patients. The full ingredient list of any steroid injection should be carefully reviewed in advance -- and then re-reviewed if a reaction develops.    The mast cell's  repertoire includes a number of leukotriene moieties, and just as one can't predict which benefits might be seen with other medications tried in this disease, one shouldn't assume that there can't be any benefits beyond the respiratory tract from this traditionally asthma-targeted drug. The leukotriene receptor blocker that's usually tried first is montelukast. In my experience, MCAS patients who respond to montelukast usually respond better to twice-daily dosing (10 mg bid) than once-daily dosing; occasional patients respond even better at 20 mg bid.  I have not seen more benefit with dosing of the leukotriene receptor blockers more frequently than bid. (Some patients clearly do better with brand-name Singulair vs. generic montelukast, or vice versa, likely due more to excipient issues than anything else.) Leukotriene synthesis inhibition can be tried, too, with zileuton, which has a short half-life such that 600 mg qid dosing generally is recommended over 1200 mg bid dosing. However, an extended-release formulation of zileuton is now available; dosing is 1200 mg bid. Liver function tests should be checked at baseline and then monitored monthly in the first quarter and then quarterly when starting zileuton.    Ketotifen is available very cheaply in every country on the planet except the [place] where it is available in oral form only via  compounding, which of course incurs substantial cost. It is commonly started at 1 mg bid, escalating every 1-2 weeks as tolerated in steps of 1 mg bid to maximal efficacy or a maximum dose of 4-6 mg bid-tid. Ketotifen eyedrops can be helpful for the eye irritation frequently seen in MCAS, and this is the sole form of ketotifen that is available in the [place] through standard commercial pharmacies. Demonstration of efficacy of ketotifen in one form is often a sign that the other form will be effective, too. In my experience, different compounding pharmacies have quoted vastly different prices for compounding oral ketotifen; patients are advised to obtain multiple quotes. Although I do have some patients who have chosen to import inexpensive commercial oral ketotifen formulations from foreign pharmacies, given the many concerning reports of poor quality of some medication products obtained through some foreign pharmacies, I do not recommend my [place] patients obtain through a foreign pharmacy any pharmaceutical which can be legally (even if more expensively) obtained through a [place] pharmacy. Especially given the legal protections afforded [place] patients who obtain medications through [place] pharmacies -- protections which may be partly compromised or even non-existent in dealing with foreign pharmacies -- foreign-sourcing of pharmaceuticals is a risk calculation to be made entirely by the patient.    As mentioned above, tranilast is readily available in the [place] but only available in the [place] via compounding. Dosing typically is 200 mg tid.    Especially in view of how the same serotonin reuptake elements present on neurons are also present on the surface of the mast cells, selective serotonin reuptake inhibitors (SSRIs) can be helpful in some MCAS patients both through neuronal binding and mast cell binding. Dosing of SSRIs in MCAS is similar to dosing of these drugs for their approved indications. Of note, physicians  "who prescribe SSRIs in MCAS patients must be alert to development of, and know how to manage, serotonin syndrome, whose presentation can easily be confused for a flare of mast cell activation.    Cromolyn is not absorbed to any significant extent (there is controversy as to whether this holds true in the pulmonary tree) and thus does not circulate systemically to any significant extent but still can be helpful in its OTC nasal spray (Nasalcrom) and eyedrop (Opticrom) formulations as well as when inhaled or swallowed. (A cream for topical cutaneous use can be compounded at home, too, using a bottle of Nasalcrom and various skin creams such as Eucerin; recipes can readily be found on-line.) The oral form is typically started as 100 mg (one ampule) twice daily (preferably, but not necessarily, 30 minutes before meals) and escalated every 1-2 weeks in dose or frequency as tolerated to maximal efficacy or a maximum dose of 200 mg qid, though a few patients have told me that 300-400 mg qid has provided them optimal benefit from the drug and that it's unhelpful for them at lower doses. The nebulized form is typically started at 20 mg bid and escalated every 1-2 weeks as tolerated to maximal efficacy or a maximum dose of 20 mg qid. In a minority of patients, initiation of cromolyn causes an initial mild-moderate flaring of disease that usually can be managed with simple extra dosings of \"rescue medications\" (e.g., H1/H2 blockers, possibly also benzodiazepines and/or steroids). Such flares typically settle after 3-7 days, but if flares are severe or appear to be coursing chronically rather than settling, then the drug should be stopped unless it is the generic formulation that was initially dispensed, in which case a trial of brand-name Gastrocrom should also be pursued, as I have seen a number of MCAS patients who find the generic formulation intolerable but who then tolerate, and respond well, to Gastrocrom (in spite of " "both products bearing identical ingredient lists of just cromolyn and sterile water), obviously implicating the presence in the generic product of an excipient of some sort (whether known to the  or not) which is an offending/triggering excipient in at least some MCAS patients. Importantly, cromolyn is not absorbed, so local and distant benefits seen from successful inhibition of mast cell activation at one site by one form of cromolyn (e.g., oral) may be different from local and distant benefits seen from additional application of cromolyn at a different site (e.g., the sinonasal tract, with nasal cromolyn spray).    Pentosan is typically used to help settle mast cell activation in the  tract (e.g., when the sterile inflammatory symptoms of \"interstitial cystitis\" -- frequency, urgency, dysuria, etc. -- are present). Dosing is 100 mg bid-tid and liver function tests must be monitored (typically monthly in the first quarter and quarterly thereafter).    Long available in [place] until finally gaining approval in the [place] in 6/15, ivabradine is officially approved for heart failure but can be helpful in MCAS, principally for patients particularly bothered by tachycardia. The approved initial dosing of 5 mg bid often is excessive for MCAS patients, and starting instead at just 1.25 mg once daily is probably more appropriate, escalating every 1-2 weeks as tolerated (bradycardia often is the limiting symptom) to maximal efficacy or a maximum dose of 7.5 mg bid.    Much as how benzodiazepines can downregulate neurons and mast cells via inhibitory cell-surface benzodiazepine receptors, dronabinol can downregulate neurons and mast cells via inhibitory cell-surface cannabinoid receptors. Dronabinol dosing typically begins at 2.5 mg bid and escalates every 1-2 weeks as tolerated in steps of 2.5 mg bid to maximal efficacy or a maximum dose of around 5-7.5 mg bid-tid. The key compound desired in the cannabinoids, " "of course, is cannabidiol, not the \"high\"-producing THC. I have heard from occasional MCAS patients who report cannabidiol oil (now legally accessible in certain localities) to be helpful. It is difficult to recommend smoking of cannabis due to the many other toxic compounds in smoke, which in general is a mast cell activator.    Hydroxyurea has been recognized for decades as capable of settling mast cell activation in some patients. The dosing needed to achieve such effect is typically low, starting at 500 mg once daily and escalating after 2-4 weeks as tolerated to maximal efficacy or 1000 mg once daily (though some patients report better effect at 500 mg bid). Patients who react acutely and severely to the \"Hydrea\" 500 mg formulation likely are reacting to excipients, and I have found most such patients (once recovered after having stopped the medicine) subsequently tolerate and respond well to an alternative 200 mg \"Droxia\" formulation, with optimal dosing usually working out to be in the 600-1000 mg total daily dose range. In my experience, hydroxyurea has been particularly useful for treating the diffusely migratory soft-tissue/bone aching/pain commonly seen in MCAS.    Perhaps due directly to the IgG receptors on the surface of the mast cell, and/or perhaps due to other, less direct mechanisms, IV immune globulin (IVIG) helps some MCAS patients (who often first come to IVIG therapy via diagnosis (prior to diagnosis of MCAS, of course) with \"combined variable immunodeficiency\" or other, typically poorly defined immunodeficiency syndromes), principally (per my unpublished observations) in reducing fatigue for 1-2 weeks, in accordance with the half-life of human antibodies. Due to the drug's modest and brief benefits and great expense, I am not much of a fan of using IVIG for treating MCAS, and I suspect most patients being treated as such likely can find more medically and financially effective therapy. Still, " "when most or all other options have been exhausted, it is not an entirely unreasonable option from a biological plausability perspective, though I should note I'm unaware of any published literature (even case reports) actually supporting this particular use of IVIG.    As noted in many case reports now in the peer-reviewed literature, the anti-IgE monoclonal antibody omalizumab appears to benefit some patients with MCAS, utterly independent of the pre-treatment IgE level. Dosing typically begins at 150 mg subcutaneously once every four weeks and escalates in steps as high as 300 mg subcutaneously every two weeks. In counseling patients about the risks of the drug, they should be advised of the 0.1% risk of fatal anaphylaxis mentioned in the product insert, and it is important to follow the 's recommendations to observe the patient in the infusion suite for 2-3 hours after each of the first three injections and then at least 30 minutes after each subsequent injection. In my experience, omalizumab tends to be more helpful for MCAS patients with prolific allergies, but its great cost (list price approximately US$30,000/year) needs to be considered when deciding whether to try it ahead of less expensive therapies. Before starting omalizumab, it may be worthwhile checking an anti-IgE IgG level, as an elevated level may indicate the presence of a true autoimmune-mediated mast cell activation for which rituximab (see below) might also be worth trying. Again, if this is going to be checked, it needs to be checked before starting omalizumab since the drug will confound the test and the patient will have to be off the drug at least a year before a reliable native anti-IgE IgG level can be determined again.    Similar cost-benefit calculation is required regarding the tyrosine kinase inhibitors. The prototypical tyrosine kinase inhibitor imatinib (~[place]$100,000/year in the [place] for name-brand \"Gleevec\" from " "[employer], or ~US$60,000/year for the newly available generic imatinib from [employer]) is believed to target and block/inhibit some of the constitutively activating mutations in KIT suggested by some of the published research to be present in virtually every MCAS patient. 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 Imatinib-sensitive mast cell disease tends to be sensitive to low doses of the drug. Imatinib is typically managed by a hematologist/oncologist. A starting dose of 100 mg once daily is appropriate, escalating after one week (if tolerating it OK but unimproved) to 200 mg which typically is given once daily, though I have seen occasional patients report substantially better effect at 100 mg bid or 50 mg tid-qid dosing. In my experience now in seeing this drug control MCAS to a significant degree in several dozen patients, the \"sweet spot\" for dosing tends to be a 200 mg total daily dose (most do fine with once daily dosing), but I do have a few patients who have clearly identified that higher doses (300-600 mg total daily dose) definitely serve them better. In my experience, imatinib has tended to provide substantive benefit in MCAS patients manifesting relative or absolute erythrocytosis, perhaps as a consequence of constitutive KIT activation in turn driving JAK2 activation. (Of course, activated KIT will drive activation of the other, inflammation-driving JAKs, too, suggesting a potential role for the typically promiscuous NICOLA inhibitors in controlling at least some of the symptoms in mast cell disease. Indeed, I have already seen (but not yet published) excellent responses in mast-cell-driven symptoms from ruxolitinib in myelofibrosis and polycythemia vera patients and from tofacitinib in rheumatoid arthritis patients.) Absolute erythrocytosis, of course, is easy to recognize (hemoglobin, hematocrit, " "and/or red cell count above the upper limit of normal), but a relative erythrocytosis is more difficult to recognize, manifesting as a \"normal\" H/H in patients with marked multisystem inflammation which one would expect to cause a secondary anemia of chronic inflammation. A chronically inflamed MCAS patient's \"normal\" H/H may be evidence of the abnormal \"pressure\" being exerted on marrow to overproduce red cells, and this finding, set against a history and exam strongly suggestive of significant chronic multisystem inflammation, hints that a trial of imatinib might be appropriate sooner than its great cost might otherwise warrant. In patients with trying economic circumstances, sometimes the 's patient assistance program needs to be engaged in order for the patient to be able to access imatinib. Additional manufacturers are expected to make generic imatinib formulations available beginning in August 2016 when [employer] loses its exclusive license for marketing generic imatinib, and the price is expected to fall dramatically further at that point. Patients experiencing difficulty tolerating fake company 2[employer]- and [employer]-branded imatinib may find future formulations of imatinib, using different excipients, more tolerable. It remains to be seen whether imatinib will be made available by any  for compounding.    As inferred above, this patient's chronic multisystem inflammation would be expected to cause at least a mild anemia of chronic inflammation, so the \"normal\" H/H in this patient may represent a modest relative polycythemia, thus potentially arguing for a trial of imatinib somewhat earlier in the sequence of therapeutic efforts than its extraordinary cost ordinarily would warrant. (One wonders whether the polycythemia in MCAS comes dominantly from constitutive activation of JAK2 caused by (mutated), constitutively activated KIT (various mutant forms of which imatinib and the other tyrosine " kinase inhibitors can address).)    I also have seen low-dose imatinib (again, most commonly 200 mg/d) quickly bring complete resolution to the lipodystrophy (along with great improvement in many other symptoms) in a number of patients who were diagnosed with Dercum's disease before later being discovered to have MCAS as their unifying diagnosis. I also have seen low-dose imatinib quickly bring complete resolution to very severe, otherwise refractory hypertriglyceridemia, similarly suggesting there is some pathway by which MCAS can generate such a dyslipidemia.    Some patients who do not respond to imatinib go on to respond well to other TKIs such as dasatinib or nilotinib. As with imatinib, usually only low doses (e.g., 20-40 mg of dasatinib daily) are needed in such patients. Although there are a very few cases reported in the literature of nilotinib helping to control mast cell disease, I myself have seen no successes with this drug in the few patients in whom I have tried it; in fact, I have seen acute intolerance in about half the patients in whom I have tried it, again suggesting an issue with reactivity to the medication product's excipients rather than nilotinib itself. I also am aware of a few cases (one recently published in the  Journal of Haematology) in which low-dose (12.5 mg/d) sunitinib was given for very severe MCAS and rapidly achieved complete response (1 case) or very good partial response (a few additional cases) without any apparent toxicity (now sustained about two years in the published patient with a complete response). (There also is a published pre-clinical report in a rat model providing rationale for trying sunitinib in mast cell disease.)    In general, I have observed low-dose dasatinib to be more effective for CNS-related symptoms (e.g., profound, ultra-refractory depression in one patient) and low-dose sunitinib to be more effective for patients whose MCAS phenotype features  a strong allergic/anaphylactoid component. Although dasatinib has a propensity at CML-level dosing (~100 mg/d) to drive effusions, I have never seen such problems develop at the 20-40 mg/d dosing level. Still, in a patient with significant pre-existing pulmonary disease, the potential for pulmonary complications would have to be watched especially carefully if dasatinib were to be started.    I have occasionally seen somatostatin prove helpful for refractory non-infectious diarrhea in MCAS.  Usually the long-acting form of the drug (Sandostatin LAR) is used, with dosing typically in the 10-30 mg range (taken subcutaneously every four weeks).    I have heard from an occasional colleague (though not seen reported yet) that ursodiol (standard dosing 300 mg bid) can be helpful for GI symptoms in an occasional MCAS patient, though to date I myself have seen such improvement in only a single patient.    As inferred by its very name, MCAS is a disease principally of inappropriate mast cell activation, not inappropriate mast cell proliferation. As such, it would seem there is little call in the management of MCAS for the antiproliferative treatments used to treat mastocytosis such as cladribine and interferon. Both drugs have substantial toxicities which also would question their utility in MCAS, principally cytopenias and immunosuppression with cladribine and a flu-like syndrome with interferon which in my experience often don't tachyphylax as significantly or briskly as the manufacturers typically promote. Alpha interferon also bears risks for significant cytopenias, hypothyroidism, and depression. Nevertheless, downregulation of mast cell activation has been seen with alpha interferon.  Thus, one wonders whether a cautious trial of the drug might be worthwhile at some point in otherwise refractory MCAS patients, particularly those whose medication excipient intolerance issues preclude trials of most oral medications. I  "am unaware of any peer-reviewed literature reporting the use of cytotoxic chemotherapy such as cladribine, or any type of interferon, in the treatment of MCAS. There are preclinical data suggesting CD30-targeting brentuximab may be helpful for cytoreduction and  release downregulation in mastocytosis, but there has been no clinical testing yet in that disease, let alone in MCAS.    Urgent/emergent management of flares of mast cell disease generally include extra dosings (IV if possible) of H1 blockers (e.g., diphenhydramine  mg) and H2 blockers (e.g., famotidine 20-60 mg), possibly also with glucocorticoids (e.g., methylprednisolone 1-2 mg/kg) and possibly also with benzodiazepines (e.g., lorazepam 1-3 mg). Patients are advised to try different formulations of rapid-acting antihistamine products (e.g., liquid formulations, or rapid-dissolving formulations such as Claritin Reditab or Zyrtec Fast-Melt) to identify what is tolerable and works well for them and then to regularly carry such products with them if they frequently suffer flares. Of course, epinephrine is always the \"go-to\" drug for anaphylaxis unless the patient is on a beta-blocker, in which glucagon is the \"work-around\" drug. If a mast cell patient has suffered repeated jeff anaphylaxis or severely anaphylactoid states, particularly if the triggers are unclear, it is recommended that the patient always have immediate access to two doses of epinephrine (or glucagon, as just noted), usually via an Epi-Pen or equivalent device. Although The Mastocytosis Society and some experts recommend that *all* patients with (any form of) mast cell disease always carry epinephrine autoinjectors, it is unclear to me -- especially as we are increasingly learning how large and diverse the MCAS population truly is -- whether such a recommendation should indeed be made so globally. Though anaphylaxis obviously can be quickly fatal, and though anaphylaxis is " always a risk at some level in (diagnosed and undiagnosed) mast cell disease patients, past behavior of MCAS largely predicts future behavior (at least until a new major stressor occurs), so in my opinion it is acceptable for patients who have never anaphylaxed (or perhaps anaphylaxed only a long time ago to a specific agent which they have since avoided and are likely to continue to be successful in avoiding) to weigh their small (but non-zero) risk for anaphylaxis against the costs, in all the meanings of that word, of carrying epinephrine autoinjectors on their persons for the rest of their lives. Some patients feel Epi-Pen Twinjectors or other autoinjector devices are more convenient than standard traditional Epi-Pens; efforts should be made to accommodate the preferences of the patient who will need to carry the device(s) with him/her for the rest of his/her life. The indications for usage of an epinephrine autoinjector is when the patient can't breathe or can't swallow, and it's important that patients be instructed in the proper use of such a device (in general: call for help; lie down flat; inject in one lateral thigh (through clothing is OK) (unless the instructions with her specific device direct an alternative injection approach), then inject again in the contralateral thigh if unimproved after five minutes and help hasn't yet arrived). For obvious reasons, it's very important for patients to read the instructions for their epinephrine autoinjectors as soon as they receive them, and preferably for them to even have a chance to simulate use with a dummy device.    Another drug primarily used for emergent management of MCAS -- particularly in patients with refractory angioedema -- is icatibant (30 mg, injected subcutaneously in the abdomen). The drug can be extremely effective very quickly, but its extreme expense (approximately US$7,000 per dose) perhaps calls for at least a brief trial of routine emergent  "management (e.g., antihistamines, steroids, benzodiazepines) before resorting to this option.  One must also be cognizant of the fact that icatibant is in the class of peptidergic drugs, and data have begun emerging in the literature suggesting that this class of agents may pose as triggers in some MCAD/MCAS patients.    I'll also note that there have now been a very few case reports in the literature of rituximab working very well to control (at least for several months) otherwise refractory \"idiopathic anaphylaxis\" (IA), which in my opinion almost certainly is a manifestation of MCAS. There also are small studies showing efficacy of immunosuppressive approaches such as with rituximab or cyclophosphamide or cyclosporine in treatment of \"chronic (auto)immune urticaria\" (associated with anti-IgE and/or anti-IgE-receptor autoantibodies).  It is likely that IA and CIU are manifestations of MCAD/MCAS, and thus, whether one applies one of these diagnostic labels or another to such patients, I feel rituximab (or other immunosuppressive approaches as briefly suggested above) are reasonable, but patients and their treating physicians need to be cognizant that rituximab is not curative and is expensive and that it takes roughly one year to reconstitute B-cell immunity after rituximab treatment. I'll also note that it's the treatment successes, not failures, that tend to get reported in the case literature, and I have heard from some physicians that their trials of rituximab for a few of their IA/MCAS patients have shown no benefit at all.  I, too, have seen more failures of immunosuppressant treatment of MCAD/MCAS than successes, but the occasional success of course is gratifying when many other treatments have failed.    It is extremely difficult to know what to make of, and what to do with/about, this patient's elevated anti-IgE-receptor antibody level, which may -- or may not -- be contributing to chronic mast cell " "activation.  (Note that autoimmunity is frequently \"spun off\" from/by mast cell disease, and in my experience most such autoantibodies are pseudoantibodies or \"mimicking\" antibodies, i.e., specific enough to react with the corresponding probe but insufficiently specific to actually cause the disease with which the antibody is associated.)  There simply is no testing available that can distinguish clinically active mast-cell-targeting autoantibodies from non-pathogenic pseudo-mast-cell-targeting autoantibodies.  My clinical intuition in this case is that it would be better to defer trials of immunosuppressive therapies directing at suppressing autoantibody production until the patient has proven refractory to a large array of other therapies (including imatinib).    Perioperative management is similar to emergent management. The surgeon and anesthesiologist should be aware of the patient's mast cell disease and should read any of the many treatises in the literature on the subject (a link to one such article is provided below). Perioperative guidance for professionals is also available on the website of The Mastocytosis Society at http://www.[email or web address].    A medical alert bracelet is often helpful to emergency personnel. As most such personnel are not presently trained regarding mast cell disease, the first word on the bracelet should be \"anaphylaxis.\"    Although it's likely that most or all of an MCAS patient's various problems are due directly or indirectly to the MCAS, I'll also note the imperative that MCAS patients not assume that major exacerbations of prior problems, or emergence of new problems, are necessarily due to their mast cell disease. Serious illness should always first undergo standard evaluation as appropriate for the particulars of that illness, and only if no other etiology is identified, and if the problem is one that can reasonably be attributed to mast cell disease, is it then appropriate " "to attribute the problem, by default, to the mast cell disease. Even if an identified problem is indeed attributable (directly or indirectly) to mast cell disease, any standard/classic therapy that exists for the problem should be applied (concurrently with MCAS-directed therapy). For example, mast cell disease can cause myocardial infarction via multiple mechanisms, but standard myocardial infarction therapy is certainly the priority over mast-cell-directed therapy for any myocardial infarction being driven by mast cell disease. The tendency of an MCAS patient to blame all ills directly on the mast cell disease, and the desire in such patients to focus solely on MCAS-directed therapy, may be understandable, and MCAS patients typically have so often been disserved in urgent and emergency care facilities that their desire to avoid them may also be understandable, but staying home and trying to initially treat acute severe chest pain with Benadryl is obviously a Very Bad Idea that could easily and quickly prove fatal.    It's so important that I will say it again: there is no method at present to provide better guidance as to which medications should be tried sooner vs. later, there is no \"right\" or \"wrong\" decision as to which intervention to try next, and there simply is no substitute for patience, persistence, and a methodical approach -- on the parts of both the patient and the physician -- in the journey toward identifying optimal therapy for the individual patient. Some patients are so mckinley as to identify substantially helpful therapy within the first few months of beginning the journey; other patients require years, and trials of more than a dozen medications, before a truly beneficial one is found. Enrollment in clinical trials should be considered when such trials become available.    Mast cell disease often drives bone changes (far more often osteopenia or osteoporosis than osteosclerosis, but sometimes " "both osteopenia/osteoporosis and osteosclerosis at different sites in the same patient at the same time). It is reasonable to check baseline bone densitometry upon diagnosis of a mast cell disorder if not already recently done. If the patient is found to be osteopenic or osteoporotic, routine vitamin D and calcium treatment should be tried first, but if follow-up bone densitometry proves such basic treatment to be unhelpful, then bisphosphonate or anti-RANKL therapy is warranted. (I'll note I've often seen MCAS patients prove intolerant of bisphosphonates, but I've never seen bisphosphonate-intolerant MCAS patients prove intolerant of (or unresponsive to) denosumab.) Of course, the single best approach to managing bone disease consequent to MCAS is to control the MCAS as best as possible. I should also note that use of bisphosphonates or denosumab requires pre-treatment clearance by a dentist due to the possibility of these drugs causing (potentially quite morbid, even fatal) osteonecrosis of the jaw (ONJ) in patients with poor dentition or recent dental work.    A priori, the odds of eventually achieving the typical (palliative) MCAS management goal (of finding a regimen that will help the patient feel significantly better than the pre-treatment baseline the majority of the time) in any given MCAS patient are as good as in any other patient (that is to say, pretty good), but only time will tell the ultimate outcome for the individual patient.    In case the following might be helpful, here are a few references to peer-reviewed literature and other educational material about MCAS (authorial bias acknowledged):    1. A short review that provides the \"Molderings 2011\" diagnostic criteria: http://www.jhoonline.org/content/[email or web address]. Also, the Valent 2012 criteria are available at [email or web address], but I'll note I strongly favor the Molderings 2011 criteria over the Valent 2012 " criteria since in my experience the latter fit the observed and reported biology of the disease less well than the former.    2. An updated short review from 2014: [email or web address].    3. An approach to diagnosis from 2014: [email or web address]://[email or web address].    4. A comprehensive review chapter from 2013: [email or web address]_[email or web address]?products_id=[email or web address].    5. A transcribed grand rounds (including slides) from 2011 ([facility]): [email or web address]://[email or web address].    6. A video of a grand rounds from 2014 ([facility]): see the August 6, 2014 entry under Allergy Webinars at [email or web address].    7. There are many reviews in the peer-reviewed medical literature about perioperative management of mast cell disease (all focused on mastocytosis, but the principles are the same for MCAS). At present, the most recent is Dewachter et al., Anesthesiology 2014, DOI 10.1097/ALN.[id number] (this may be freely available to some at http://[email or web address].pubs.[email or web address]/article.aspx?[email or web address]). A shorter discussion of perioperative management is available from The Mastocytosis Society at [email or web address]://www.[email or web address]/documents/ChroniclesSE.pdf.    8. I should note, too, that given the fundamental precept that mast cells are present, and contribute to regulation of function, in *all* systems/organs/tissues, it is most certainly the case that MCAS can cause a wide range of neuropsychiatric symptoms and disorders, too. Prior to diagnosis, most MCAS patients are thought by at least some of their providers, family, and acquaintances to be psychosomatic. Along with a number of co-authors, I recently published a review of the neuropsychiatric aspects of MCAD. It's not freely available, but the access point is  "http://www.sciencedirect.com/science/article/pii/[email or web address]. I'm happy to provide a copy of this for private use upon request.    9. My early experience with continuous diphenhydramine infusion is available as an American Society of Hematology 2015 abstract at [email or web address]://[email or web address].    10. A comprehensive review of pharmacologic therapy of systemic mast cell activation disease has been published recently (Sanjiv SANCHEZ et al., Pharmacological treatment options for mast cell activation disease, [facility] 2016 Apr 30, DOI: 10.1007/[id number]).    In summary, I think there are many therapeutic directions that could be legitimately pursued in this patient, and there simply are no data at present to say, or even suggest, that any one particular sequencing of medication trials is \"more right\" or \"more wrong\" than any other sequencing. Determination of a particular sequence to be pursued in this patient will depend on the treating provider's best clinical sense of the matter as influenced by present and emerging symptoms and findings, patient desires, and treatment accessibility (i.e., vis-a-vis third-party payer coverage). Again, there is no \"right\" or \"wrong\" decision here regarding which medication to try next. I can only emphasize the importance of having the patience to make just one change in the regimen at a time whenever possible, and in truth the only \"wrong\" decision that can be made here is to stop trying altogether (unless, of course, the patient decides, for whatever reason, she just does not want to pursue any more medication trials).    All of the above having been said, I think that given the particulars of her presentation, it might be best to focus aggressively at first on identifying an optimal full (H1 + H2) histamine receptor blocker regimen for her.  Beyond antihistamines, in view of her elevated urinary N-methylhistamine levels, " "vitamin C supplementation and/or JULIETA supplementation might be helpful.  Given her inflammatory issues, trials of NSAIDs (e.g., aspirin first, then celecoxib if intolerant of or unresponsive to aspirin) would be reasonable. For GI tract issues, medications beyond antihistamines that might be helpful include (but certainly are not limited to) oral cromolyn, montelukast, compounded oral ketotifen, and low-dose benzodiazepines.  If oral cromolyn is helpful, other forms of cromolyn (e.g., nasal, ophthalmic, nebulized) might also help.  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  Clearly, there are many options, and it will be important for her to try just one at a time as much as possible, and it also will be best for her to work principally with just one of her physicians in stepping through trials of various medications for this disease, though other local consultants certainly can be engaged as necessary for trials of select medications with which her lead physician may be unfamiliar or otherwise uncomfortable in personally prescribing (e.g., imatinib).    The previously established follow-up plan remains sufficient. The patient understands that out of professional courtesy I will not \"cold-call\" or \"cold-email\" any health care provider about her. However, I will be happy to respond to any provider's *direct* request to me for additional input in her case. I can best be contacted either via e-mail at [email or web address], or a phone call can be scheduled via office staff here at " "[phone], or I can be paged via our [facility]'s paging  at [phone] if emergency consultation is felt necessary.  Also, my clinic nurse coordinator, Stefani Chaves RN, can be contacted at [phone]; patients wishing to contact her electronically should preferentially use the \"[employer]\" portion of the electronic medical record system here. Of note, at present I do not provide phone-based (or video-based) follow-up visits to patients, and the phone numbers I've provided here are intended for use by the patient's physicians and other providers. A patient who calls me to report new problems (or severe exacerbations of old problems) and request guidance (or request provision of guidance to her local provider) should expect me to redirect her to her local providers to first be properly assessed in standard fashion for the particular problems and complaints being experienced at that time, and if, after such evaluation has been performed, the provider desires to discuss the situation further with me, I will be happy to engage in that dialogue if, again, the provider directly requests my input. As stated above, mast cell disease does not render one immune to other diseases, and the patient and all of her providers must remain vigilant to the possible emergence of other illnesses (whether ultimately attributable to her mast cell disease or not) for which standard (non-mast-cell-directed) treatments have been defined.    I spent another two hours writing this addendum, but as this was not face-to-face time with the patient, no additional billing was submitted.      Typed, reviewed, and electronically signed by: Baudilio Avila M.D.     DT: 02/11/2017 04:25 PM    cc: 1. Sheryl Leventhal, M.D. ([facility], [address] [place] [address], [phone], fax [phone])  2. Please also mail a copy to the patient at her home address as listed in the system.  "

## 2017-01-18 NOTE — Clinical Note
Patient waiting.  Labs on the 1st floor on the way out and again Friday morning.  RTC ~1 year (no labs).

## 2017-01-18 NOTE — PROGRESS NOTES
"Spoke with pt this afternoon after she saw Dr. Avila.  Per , pt to decide what lab testing she wants to have done, if any, re: [place] (not even for consultation).  Spoke with May Barreto about situation including that Dr. Avila has ordered additional testing then his \"usual\".  She gave author some information and stated pt could call her to discuss costs first before any lab work on Friday ( 24 hour urine collection kit today).    RN spoke with pt reviewing some general information including $2345.60 (with discount) price for MD visit and just 24 hour urine tests as well as in general, Dr. Avila's mast cell  testing runs at least $6400.  Pt was able to discuss with May via her cell phone in clinic.  May working on pricing for pt and they will connect to discuss further tomorrow.  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  Gave pt written instructions for 24 hour urine testing.  Reviewed instructions including importance of keeping specimen refrigerator cold during the entire collection process and transportation re: several of the mediators being tested for do not last more than 1 or 2 minutes in blood or urine at room temperature.  Answered pt's questions about collection & transportation process.  She will go to lab to  24 hour urine testing \"kit\"; no labs.  Author called lab and spoke with staff member to give her heads up on plan (no blood/urine testing today).      "

## 2017-01-18 NOTE — NURSING NOTE
"Argenis Aldana is a 51 year old female who presents for:  Chief Complaint   Patient presents with     Oncology Clinic Visit     mast cell- new patient        Initial Vitals:  /70 mmHg  Pulse 63  Temp(Src) 96  F (35.6  C) (Tympanic)  Resp 12  Ht 1.676 m (5' 6\")  Wt 51.71 kg (114 lb)  BMI 18.41 kg/m2  SpO2 98%  Breastfeeding? No Estimated body mass index is 18.41 kg/(m^2) as calculated from the following:    Height as of this encounter: 1.676 m (5' 6\").    Weight as of this encounter: 51.71 kg (114 lb).. Body surface area is 1.55 meters squared. BP completed using cuff size: regular  No Pain (0) No LMP recorded. Allergies and medications reviewed.     Medications: Medication refills not needed today.  Pharmacy name entered into EPIC: Data Unavailable    Comments: patient is not currently on any medications at this time.    7 minutes for nursing intake (face to face time)   Mirela Talley CMA          "

## 2017-01-18 NOTE — LETTER
"  2017      RE: Argenis Aldana  [address] [place] [id number]       Patient: Argenis Aldana   (MRN [id number])   Encounter Date: 2017  Referring: Sheryl Leventhal, M.D. ([facility], [address] [place], [place] [id number], [phone], fax [phone])  Attending: Baudilio Avila M.D.     Chief Complaint/Reason for Referral: Second opinion regarding possible mast cell activation disease (MCAD).    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  At the beginning of the encounter, I reviewed all available chart data, consisting principally of about 200 pages of outside hardcopy medical records, the salient points of which I've incorporated into the narrative below.  The history here is a bit complex, and it's probably best to just present it all chronologically, blending HPI and PMH as follows.  Her history of multisystem polymorbidity of generally inflammatory and allergic themes is virtually lifelong, as she recalls problems dating to her earliest memories with constipation and unusual lethargy relative to her peers, followed around age 7 by onset of vaginal \"yeast infections\" which have frequently plagued her ever since (though often with curiously negative microbiologic findings).  Also around age 7 she began suffering episodes, usually post-prandial, of acute abdominal distention (\"like I'm suddenly pregnant\"), and these episodes, too, have continued through the rest of her life.  She recalls being taken by her father to the ER at least three times between 7 and 12 for this and each time being told, after an unrevealing evaluation, that the problem must be psychosomatic.  She knows she has been anemic ever since some point in childhood; she says no " "treatment has ever helped this.  She recalls that around age 9 problems with asthma began emerging.  Her lifelong problems with frequent episodes of sinusitis emerged around age 10.  Menarche came at 14 and was unremarkable, but around age 15, soon after she joined the swim team, she had to quit because chlorine exposure was causing rashes and sinusitis.  All of these problems continued, relatively stable, through the rest of adolescence and her 20s and 30s, including recurrent episodes of sinusitis and vaginal yeast infections.  Her first pregnancy came at 38 and proceeded smoothly.  Post-partum, she found herself perpetually exhausted and attributed this to the colicky baby as well as taking care of two young children that came to her marriage by her .  Due to thrush at the nipple and what sounds like a resulting painful candidal mastitis, she was unable to breast-feed.  She says the pregnancy was really the defining event in her life, as she has never again felt well since the pregnancy.  Since delivery she began experiencing frequent problems (every 1-3 months) with sinusitis, rashes, swelling of the extremities and tongue, alopecia, migraines, limited mobility and pain migrating about the bilateral neck/shoulders, and ongoing exhaustion.  She says six testings for celiac disease were negative, but then she went to a different specialist and underwent some sort of \"special\" blood and stool testing and was finally diagnosed with celiac disease.  She went gluten-free and saw essentially complete remission of sinusitis, yeast infections, anal sores, and neck/shoulder pain, but hand swelling and sores and alopecia continued.  She found \"high mold\" in her body, had the air ducts in her home cleaned in early '16, then found an elevated mercury level and underwent some sort of a detoxification process for this, too.  She also says she contracted an Ehrlichia infection from a tick from the woods near her [place], " "but this was treated with antibiotics.  However, \"despite seeing 45 doctors,\" she continued to feel generally unwell.  In her own research, she determined that she might have MCAD, leading to the present evaluation.    On a full review of systems, although she denies any issues with anorexia, problems with ears or hearing, epistaxis, easy bleeding, intranasal sores, dysphagia, dyspnea, chest pain/discomfort, nauesa, vomiting, diarrhea, syncope, or mental health problems, she endorses a wide range of other, chronic/recurrent, episodic/intermittent and/or waxing/waning issues including subjective (rarely objective) fevers, flushing, feeling cold much of the time, fatigue (often to the point of exhaustion), malaise, headaches, diffusely migratory aching/pain, diffusely migratory pruritus, unprovoked soaking sweats, Raynaud's phenomenon, lactose intolerance, 8 pound weight loss on a salicylate-free diet recently, irritation of the eyes, acute brief inability to focus vision, easy bruising, sinonasal congestion, coryza, post-nasal drip, mouth sores, throat soreness, Jailyn-Parkinson-White (WPW) syndrome diagnosed at 47, occasional gastroesophageal reflux (during pregnancy and when off her salicylate-free diet), constipation since childhood, diffusely migratory abdominal discomfort including (usually post-prandial) bloating, urinary frequency and incontinence (curiously improved with her salicylate-free diet of late), diffusely migratory weakness, diffusely migratory edema (generally about the distal extremities), tingling (typically about the scalp), diffusely migratory bilateral cervical adenopathy and adenitis, occasional orthostatic and non-orthostatic presyncope in her 20s, cognitive dysfunction (particularly memory, concentration, and word-finding), hair loss, deterioration of dentition (and \"sensitivity\" to various toothpastes) despite good attention to dental hygiene, brittle nails, diffusely migratory rashes " (typically patchy macular erythema), hives, insomnia, limited neck mobility, poor healing in general, and dry skin.  Complete ROS o/w neg.    Family history is notable for a son with celiac disease, WPW, lactose intolerance, and attention deficit hyperactivity disorder, a brother who survived prostate cancer, a father who  at 60 of prostate cancer, a paternal aunt who survived breast cancer at 60, and a mother who had skin cancer and who underwent a hysterectomy in her early 30s for unknown reasons.  The patient denies any history of smoking, significant alcohol use (in fact, she is intolerant of it, with one sip causing marked leg weakness and mouth sores), or illegal substance use.    She is on no regular medications and says she reacts adversely to essentially every medication she tries.  She lists her current allergies as abdominal distention, diarrhea, and gassiness to lactose, vomiting and migraines to monosodium glutamate, swelling and diffuse flushing to sulfa, and an unknown reaction in infancy to tetracycline.    Exam finds a trim, outwardly healthy appearing woman in no apparent distress, pleasant, cooperative, fully alert and oriented, easily independently ambulatory, presenting by herself.  Vitals per chart, utterly unremarkable (weight 114 pounds).  Key findings are her healthy, comfortable general appearance, HEENT benign but for a number of dental fillings, no plethora/pallor/diaphoresis/jaundice/rash/bleeding/bruising, neck supple, no JVD or thyromegaly or carotid bruits, no palpable adenopathy or tenderness at any of the usual node-bearing sites except for a single very tiny non-tender mobile rubbery shotty right inguinal node, clear lungs, regular heart (borderline bradycardic) with no adventitious sounds, abdomen benign, no peripheral edema except for mild edema and erythema (mostly periungually, worse on the right hand than the left, which she said was her usual pattern) in the bilateral  fingers, neuro grossly intact.  On a light scratch test on the upper back, mildly bright dermatographism (erythroderma only, no hives) quickly emerged and was only growing more intense when last checked 10 minutes later.    Review of available lab data was notable for mostly normal routine blood counts and chemistries (occasional minimal normocytic anemia, occasional minimal transaminitis) and elevated blood mercury and lead levels at one point around '12, though repeat testing more recently showed lower levels of both (in fact, the lead level had reduced to normal).  A wide range of other micronutrient testing was generally normal, as were thyroid function tests.  CK normal.  Ammonia normal.  C4a high at 2250 ng/ml (normal 0-650).  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  Except for a mildly elevated HDL, a lipid panel was normal.  The only mast cell  testing I could find was a normal tryptase and chromogranin A and a normal 24-hour urinary histamine.  The patient says that none of the blood or urine samples for mast cell  testing was ever kept chilled.    A/P: Dulces to the patient, because I think she's likely right in her suspicion that a mast cell activation disorder (MCAD) -- and far more likely the far more prevalent (if only recently recognized) type termed mast cell activation syndrome (MCAS) than the rare (but long recognized) type termed mastocytosis -- is at the root of most or all of her chronic multisystem polymorbidity of generally inflammatory and allergic themes. Beyond all the other diseases already ruled out by the great extent of testing she's undergone to date, I don't know of any other human disease that comes anywhere near as close as MCAS does to accounting, directly or indirectly, for the full range and chronicity of all the symptoms and findings here.  "(Of note, too, I'm not saying that any of her prior diagnoses are wrong (except for any assertions of psychosomatism that might have been made along the way). It's just that each of them accounts for only one subset or another of all that's gone on in her, while MCAS can account for most or all of everything that's been going on in her.) All of that said, she needs objective laboratory evidence of improperly behaving mast cells (note it's possible the results from the labs on her unchilled 24-hour urine sample might be false negatives), toward which end I ordered the range of testing I typically check in assessing for MCAS, namely, a CBCD, CMP, magnesium, PT/PTT, chilled serum tryptase and chromogranin A, chilled plasma prostaglandin D2 and histamine and heparin, chilled random urinary prostaglandin D2 and N-methylhistamine and leukotriene E4 and 2,3-dinor 11-beta-prostaglandin-F2-alpha, and chilled 24-hour urinary prostaglandin D2 and N-methylhistamine and leukotriene E4 and 2,3-dinor 11-beta-prostaglandin-F2-alpha. I also ordered an anti-IgE IgG and an anti-IgE receptor antibody, which won't be useful diagnostically but may influence certain therapeutic decisions down the road if MCAS is proven. I also ordered mercury and lead levels in blood and urine specimens (spot and 24-hour), and I ordered Ehrlichiosis serologies and DNA load by PCR.  In view of her chronic \"infections\" (which I suspect were often just flares of sterile inflammation), I also ordered a quantitative immunoglobulin profile.  We confirmed she has abstained from confounding use of proton pump inhibitors (PPIs) and non-steroidal anti-inflammatory drugs (NSAIDs) for the prior 5+ days. We provided her careful written and verbal instructions regarding the 24-hour urine collection including the importance of continuous chilling of the specimen throughout collection and transport.  She understands the various reasons why a single round of  " "testing might yield all negative results, and she understands that if this happens, it of course doesn't even begin to invalidate/refute/negate even a single element of her history (which strongly argues for a mast cell disorder). Her history is what it is, so if we get a negative round of testing, I'll simply recommend repeat testing (which she'll of course need to pursue locally), albeit with specimen collection at that point preferably deferred to a particularly symptomatic day. If 2-3 rounds of blood and urine testing yields all negative results, the \"backup plan\" will be to pursue a fresh set of bidirectional endoscopies to obtain biopsies throughout the luminal GI tract for pathologic evaluation specifically (using  staining at a minimum) for increased (>20/hpf) numbers of mast cells (as often seen mildly-moderately in MCAS, though not to anywhere near the degree (or patterns) seen in mastocytosis).    Note that all of the above having been said, at the end of the visit she still was not sure she wanted to pursue testing here given that her [place]-based insurance would not cover out-of-state testing.  She said she would think further about it.  I noted to her that most of this testing likely could be performed via (properly educated) laboratory facilities in [place] except for prostaglandin D2 testing, which I have heard is banned in [place] for reasons I have never been able to discover.    Thus, at the time we parted company, it still was not clear to me whether she would pursue testing here.  If testing is performed locally instead, I strongly recommend the ordering physician discuss the testing in advance with the manager of the accessioning lab.  Specimens should be collected in pre-chilled containers and kept continuously on ice, and a refrigerated centrifuge should be used for all spins.  Specimens should be packed for transport in heavily insulated boxes with plenty of cold packs thrown in.  " An express delivery service should be used to deliver the specimens to the performing laboratories as early the next day as possible.    Although I cautioned her that she doesn't have a definitive diagnosis of MCAS yet, we did engage in extended discussion today about general aspects of MCAS including its essential nature of chronic multisystem polymorbidity of a generally inflammatory theme, the availability of many therapies shown helpful in various MCAD/MCAS patients, the good likelihood of (eventually) finding therapy that will help her achieve the goal of feeling significantly better than the pre-treatment baseline the majority of the time, and the present frustrating absence of any biomarkers that can help predict which of the many available, potentially helpful therapies is most likely to benefit the individual patient. She understands that if we are able to secure a diagnosis of MCAS, she will be dependent on pursuing -- in patient, persistent, and methodical fashion, trying as hard as possible to make just one change at a time -- trials of the various therapies (which, again, lacking predictive biomarkers, we generally pursue in order of escalating cost). She understands that once all test results are available about a month from now, I will write an addendum to this note (to again be distributed to all of her physicians listed below) providing my interpretation of the results and recommendations for next steps.    I told her that the only therapy I am willing to empirically recommend in a patient with suspected, but not yet proven, MCAS is a full (H1 + H2) histamine receptor blockade (e.g., loratadine 10 mg bid + famotidine 20 mg bid).  She understands that some patients find that alternative H1 or H2 blockers serve them better, some patients find that tid dosing serves them better than bid dosing, and some patients find that slightly higher dosages (e.g., 20-30 mg rather than 10 mg of cetirizine, or 150  "mg rather than 75 mg of ranitidine) serve them better than lower dosages. She will need to \"experiment,\" taking 2-4 weeks with each specific combination of drug/dose/frequency to understand what it can accomplish for her in controlling this disease of naturally waxing/waning intensity. I also cautioned her that MCAD/MCAS patients have quite a predilection to adversely react not necessarily to the active ingredients in their medications but rather to the excipients (fillers, binders, dyes, preservatives, etc.) in their medications. As such, if she experiences an adverse reaction very shortly after beginning an ordinarily well tolerated medication (as is certainly the case with the H1 and H2 blockers), excipient reactivity must be suspected and she should promptly work with her pharmacist to review the medication's ingredient list, try to identify the likely offending ingredient, and try one or more alternative formulations of the medication which don't share any of the offending formulation's excipients. She appears to understand.    She lives far too distantly from here to return with any significant frequency.  She understands she will remain 100% dependent on her local providers for management of all acute and chronic aspects of the disease.  I'll see her roughly annually for \"strategic reassessments.\"    As is usually the case with initial consultations for mast cell disease, this was a long encounter, 100 minutes in total, with 60 minutes spent in counseling. The patient indicated that all of her questions at this time had been answered to her satisfaction, and she expressed appreciation for the time I had given her.      Typed, reviewed, and electronically signed by: Baudilio Avila M.D.     DT: 01/18/2017 08:56 PM    cc: 1. Sheryl Leventhal, M.D. ([facility], [address] [place] [address], [phone], fax [phone])  2. Please also mail a copy to the patient " at her home address as listed in the system.      Addendum (2/11/17): Calista again to the patient, for she has been proven right: labs are back and are diagnostic.  As is commonly seen in this work-up which necessarily casts a fairly wide net over the range of clinically testable mediators which are relatively specific to the mast cell, most of the tests were normal, but the mast cell  levels detailed below were positive, and she also has an elevated anti-IgE-receptor antibody level at 30% (normal <13%).  Lead and mercury levels in blood and urine were normal, and anti-Ehrlichia and anti-Anaplasma antibodies (IgG and IgM) were completely negative, strongly suggesting she has never had an Ehrlichia infection.    Therefore, based on (1) a clinical history highly consistent with chronic/recurrent aberrant mast cell  release, (2) multiple elevated mast cell  levels in 1/17 (24-hour and random urinary N-methylhistamine both mildly elevated (on separate days) at 207 and 212 mcg/g Cr, respectively (normal ), and serum chromogranin A mildly-moderately elevated at 141 ng/ml (normal 0-95, and without any of the known confounders of heart or renal failure, recent PPI use, or evident neuroendocrine cancer)) (but a repeatedly normal serum tryptase, largely ruling out systemic mastocytosis), and (3) absence of any other evident disease better accounting for the full range and chronicity of all the symptoms and findings in the case, mast cell activation syndrome (MCAS; not systemic mastocytosis) is the underlying, unifying diagnosis (per Sanjiv et al., J Hematol Oncol 2011) for the patient's virtually lifelong complex of multisystem polymorbidity of generally inflammatory and allergic themes. Of note, again, I don't think any of her prior diagnoses are wrong (other than any assertions of psychosomatism that might have been made along the way), but the diagnosis that seems to best underlie/unify the  "largest portion (potentially even all) of her large array of past and present issues is MCAS, and therefore treatment efforts directed at such would seem to be warranted.  To be sure, all of her physicians must maintain vigilance for other processes for which -- again, whether ultimately dependent on, or completely independent of, her MCAS -- standard therapies other than mast-cell-directed therapy have been defined, as such standard therapies would of course be the more appropriate choices for such processes.  However, with MCAS now having been defined in this patient per published diagnostic criteria, emergence of a new process that is potentially consistent with MCAS can be reasonably attributed to MCAS once other \"routine\" evaluation for that process has ruled out other, \"traditional\" causes for such a process.    I'd like to briefly address just a bit more why this is MCAS and not mastocytosis. First of all, I saw no skin lesions suggestive of cutaneous mastocytosis, and her history of symptoms consistent with aberrant mast cell  release dates back to childhood (as typically seen in MCAS, in my experience now across more than 1000 MCAS patients) rather than the de koko presentation in middle or older age usually seen with systemic mastocytosis or the classic presentations of urticaria pigmentosa typically seen in childhood. Her normal tryptase, too, is far more consistent with MCAS and makes systemic mastocytosis (SM) quite unlikely (not impossible, but quite unlikely). The rare normal-tryptase (or minimally-elevated-tryptase) SM virtually always is the indolent form of SM (ISM), and to the best of our present knowledge, there's no difference in the prognosis of, or the therapeutic approach toward, ISM vs. MCAS. Therefore, even if we're missing the uncommon normal-tryptase ISM by not pursuing more biopsies of various extracutaneous tissues, there would seem to be nothing lost by \"misdiagnosing\" her with " MCAS. (There certainly are no clinical or laboratory features here to suggest a comorbid hematologic malignancy.) I'll note, too, that transformation of ISM to aggressive SM (ASM) is rare, and transformation of MCAS to any form of SM has in fact never been reported yet in the nine years since the first case reports of MCAS were published in '07.    As such, we can reasonably confidently exclude mastocytosis as the issue here and I don't think marrow examination is warranted, and until somebody figures out another diagnosis that even *better* accounts for all that has gone on in her, it seems reasonable to go with MCAS as the best root operating diagnosis here.    What I'd like to do at this point in the discussion is provide a range of general information about MCAS and its therapy.    I will note that it's of course possible that the mast cell activation found in her is purely secondary (to either her anti-IgE-receptor antibody and/or to some unknown other chronic inflammatory disease) rather than primary (mutational) -- it will require mutational analysis that won't be available in the clinical laboratory for at least another 5-10 years before such a distinction can be routinely made -- but I will assert, again, that she meets all current diagnostic criteria for MCAS and MCAS is the best unifying explanation at present for her large assortment of problems. As treatments for other (less than unifying) diagnoses have generally not yielded substantial clinical improvement to date, and since a diagnosis of MCAS has now been made per current diagnostic criteria, it would seem that treatment efforts directed at MCAS are warranted (presuming she is less than wholly satisfied with the gains she has made with her present regimen).    I feel it's important to comment on how a normal tryptase is not necessarily inconsistent with mast cell disease. Since its discovery in the 1980s, tryptase has been inexorably linked with  mast cell disease and generations of physicians have been trained that it is virtually impossible to have mast cell disease unless serum tryptase is elevated -- but we now know this precept to be incorrect. Although initial studies of tryptase led to thinking its level reflected the total body mast cell activation state, in the last decade there has been a sea change in this understanding and now it is clearly understood (and even publicly acknowledged by tryptase's discoverer) that the level of tryptase far dominantly reflects the total number of mast cells in the body and far less the total body mast cell activation state. As such, one would expect to find the significantly increased tryptase level typically found in >80% of systemic mastocytosis patients, while in MCAS (where there is little to no increase in the numbers of mast cells) one would expect to find little to no increase in the tryptase level, and when no increase can be found in tryptase, one typically has to find evidence of mast cell activation by examining other relatively specific mast cell mediators, a tricky business given their typically very short half-lives and great thermolability.    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    Although for many reasons a unifying diagnosis of a mast cell disorder typically isn't suspected until decades after symptom onset, the chronic multisystem polymorbidity of a generally inflammatory   allergic theme that is MCAS tends to initially emerge by adolescence or early adulthood but often happens as early as childhood or even infancy, this last a scenario in which the rare inborn autoinflammatory syndromes (AISs) need to be considered in the differential diagnosis, especially since (1) recent molecular investigations in the AIS area have been discovering that some of them actually are specific variants of MCAS and (2) highly specific (and  "effective) drugs exist for some of these syndromes. MCAS tends to \"step up\" its baseline symptoms at irregular intervals, generally shortly after a major (physical and/or psychological) stressor (e.g., trauma, major infection, surgery, distant travel with novel antigenic exposure, job loss or other severe financial strain, death of a loved one, etc. etc. etc.). In the absence of formal study yet of this issue, present best estimates of survival in MCAS are for a normal lifespan (akin to what's been shown in studies of indolent SM) and that a wide variety of medications have been shown effective in various MCAS patients. Although there appears to be a very low rate of transformation of indolent SM to more aggressive forms of SM, there has not yet been a single reported case (nor I have observed or heard of a single case) of MCAS transforming to any form of mastocytosis. At present, with no objective markers of therapeutic response having been developed yet (and which may be quite difficult to develop given the extreme heterogeneity of the clinical presentation of the disease), the goal of therapy is entirely subjective, namely, feeling significantly better than the pre-treatment baseline the majority of the time. Feeling \"perfect\" or feeling significantly better \"all the time\" is not a reasonable goal given the complexity of the disease (see http://[email or web address]/index.php/page/copelibrary?key=mast%20cells as one illustration of this point). Most MCAS patients are able to eventually identify significantly helpful therapy, but at present there are no published/validated biomarkers that can predict which therapies are most likely to benefit a given patient. As such, both patient and doctor must practice patience, persistence, and a methodical approach -- trying as hard as possible to make just one change in the regimen at a time -- in stepping through trials of various medications (generally proceeding in order of " increasing cost given that no better scientifically informed strategy is presently available), retaining treatments which clearly provide significant benefit (which for most medications in this area can be determined within 1-2 months) and decisively stopping those which do not meet this high bar, lest unmanageable polypharmacy develop.    In my opinion, her early history was not burdened with multisystem inflammatory issues nearly as much as one typically sees in classic autoinflammatory syndromes, so I don't think it would be reasonable to pursue genetic testing for such at this time.  Of course, if she proves refractory to a number of MCAS-targeted therapies, the utility of such testing might then be increased.  Initial evaluation by a genetic counselor usually smooths the way toward insurance coverage of the testing for these conditions (e.g., the periodic fever syndrome 7-gene sequencing panel (code PRFEVERPAN) at [facility] in [place] ([email or web address]) or the similar (periodic fever syndrome) 21-gene sequencing panel at [facility] ([email or web address])).    I think her normal plasma histamine level and the relatively modest elevations in her urinary N-methylhistamine levels make it pretty unlikely there's a deficiency in diamine oxidase (JULIETA) or a poor-metabolizing mutation of histidine N-methyltransferase (HNMT) here, and thus I think testing for JULIETA deficiency and mutational analysis of HNMT is not warranted.  Nevertheless, if these tests are desired, JULIETA deficiency testing can be pursued via [facility] in [place], [place] ([email or web address]), and HNMT mutational analysis can be ordered as a single-gene analysis at various facilities (e.g., [facility] in [place], [place] ([email or web address])).    Current understanding of the genetics in MCAS is limited. Repeated preliminary datasets from the [facility] demonstrate the disease is clonal in  "essentially every patient, albeit vastly heterogeneously so, thereby likely explaining the vast clinical heterogeneity (and vastly heterogeneous therapeutic responsiveness) with which the disease presents. The [facility] team has also provided repeated preliminary datasets demonstrating the disease is likely epidemic (present in at least 5-10% of the general [place] population), unsurprising given increasing data suggesting various epidemic chronic idiopathic inflammatory diseases (e.g., chronic fatigue syndrome, fibromyalgia, irritable bowel syndrome) may well be merely assorted variants of MCAS. Other not-so-obviously-inflammatory (but nevertheless still likely inflammatory) diseases, too, may be assorted variants of MCAS, such as chronic idiopathic urticaria, idiopathic anaphylaxis, Daniela Danlos Syndrome Type III, postural orthostatic tachycardia syndrome (POTS), dysautonomia, autism, attention deficit hyperactivity disorder, etc. etc. etc. However, to be clear, none of these diseases has yet been proven to be forms of MCAS, and much research into this hypothesis is needed in each of these diseases. Furthermore, the [facility] team has clearly demonstrated the high familial predisposition of the disease in spite of the fact that the  mutations appear virtually always somatic/acquired (i.e., not germline/inherited), and relatively early in life at that. (It is known that the mutations driving aberrant MC function in any given affected patient in an affected jonathon are different from the mutations in other affected members of the affected jonathon, explaining why the different affected members of an affected jonathon manifest somewhat different clinical presentations. The epigenetic effect of \"anticipation\" is also seen in mast cell disease kindreds, with later generations first manifesting the disease at earlier ages and with overall more severe phenotypes in later generations.) Preliminary data are just beginning to " "emerge that the reconciliation of these two superficially conflicting observations (familial predisposition vs. the somatic nature of the causative mutations) stems from recognition that most MCAS (and SM) patients also bear (inheritable) epigenetic mutations, and it is currently hypothesized that these epigenetic mutations create states of genetic fragility such that assorted biochemical signal patterns which flood the body in response to assorted (physical or psychological) stressors interact with such fragility states to create the actual genetic mutations in marrow stem cells or pluripotent progenitor cells which then \"trickle down\" to mast cells (and, to be sure, other lineages, but when the end clinical effects of such mutations are dominantly operant in the mast cell as opposed to other types of cells, it is reasonable to term the situation a mast cell activation syndrome). These genetic mutations then create states of not only constitutive mast cell activation but also aberrant mast cell reactivity, and the end clinical effects seen in any given MCAS patient are the net results of extremely complex networks of interactions among abnormal (and normal) MCs and abnormal (and normal) other cells.    As previously noted, there is a large array of drugs shown helpful in various MCAS patients, but \"Step 1\" in treating any mast cell disorder -- and I cannot overemphasize the importance of this step -- is identification (as specifically as possible) and avoidance of triggers (be they chemical/antigenic or physical such as cold, heat, ultraviolet light, etc.). Note medication excipients (e.g., fillers, binders, dyes, preservatives) often are triggers, and rapid adverse reaction to an ordinarily well tolerated drug in an MCAS patient is less likely a sign of intolerance of the active ingredient and far more likely a sign of an excipient reaction mandating prompt return to the pharmacist (commercial or compounding) to " "identify alternative formulations to be tried which do not contain any of the offending formulation's excipients. Adding a drug to the allergy list is unhelpful -- indeed, frankly counterproductive -- when the true offending agent is an excipient in the particular formulations of the drug that were tried. Offending excipients should be identified as specifically as possible, added to the allergy list, screened against the rest of the patient's present medication list, and screened against all medications prescribed in the future.    I feel I should explicitly note here that the patient's history of reacting adversely to virtually every medication product she has ever tried is very strongly suggestive of reactivity directed against excipients, not the drug molecules themselves.  Again, if the patient quickly reacts adversely to an ordinarily well tolerated medication, excipient reactivity must be suspected and it becomes imperative for the patient work with the pharmacist to first review the product's full ingredient list to try to identify the offending excipient and to then try alternative formulations of the medication not containing the suspected offending excipient(s).    \"Step 2\" in treating MCAS ordinarily is identifying an optimal full (H1 + H2) histamine receptor blockade as discussed in my initial note above.  Most MCAS patients do much better with histamine receptor blockers given at least twice daily rather than once daily; some do even better with tid dosing rather than bid dosing. Some patients clearly do better with one particular H1 blocker compared to another, or one particular H2 blocker compared to another. Thus, experimentation with different H1 and H2 blockers is reasonable to try to identify a particular optimal regimen. With the antihistamines, it usually takes only 2-4 weeks with each particular H1 blocker (at any given dose/frequency) or H2 blocker to identify (across the natural " hourly/daily/weekly waxing/waning of symptoms from inappropriate mast cell activation) the benefits and toxicities of that specific regimen, allowing a decision at that point as to which dosing (or medication) change should be tried next.  These drugs ordinarily are so well tolerated that if adverse reactions rapidly emerge, excipient reactivity is far more likely than reactivity against the drug molecule itself. In the [place] non-sedating H1 blockers that are commonly tried are loratadine (starting at 10 mg bid), cetirizine (starting at 10 mg bid), fexofenadine (starting at  mg bid), or levocetirizine (starting at 5 mg bid). H2 blockers that are commonly tried are famotidine (20-40 mg bid) and ranitidine ( mg bid); in much of [place] and certain other regions, rupatadine has been demonstrated to be a useful H1 blocker in mast cell disease. Through cautious experimentation, some MCAS patients discover that higher individual dosages (e.g., loratadine 20-30 mg instead of 10 mg) or higher dosing frequencies (e.g., tid instead of bid) bring them significantly greater benefit than lower dosages or frequencies. The patient who is taking too much H1 blocker almost always discovers that dosing is excessive when the typical anticholinergic toxicities are noted: newly (or significantly worse) dry eyes, dry sinuses, dry mouth, dry throat, urinary hesitancy, and/or constipation.    A few MCAS patients are so severely afflicted as to be in an essentially constant anaphylactoid state. Although I don't think this patient is anywhere close to the point of needing this treatment, I will note -- merely for *potential* future use in this patient, should she advance to the point of suffering severe, chronically life-threatening and/or disabling disease proving refractory to a large number of other treatments -- that I have had success in some (now more than two dozen) very severely afflicted MCAS patients with continuous  infusion of (preferably preservative-free) diphenhydramine. I typically start dosing (inpatient, to permit close monitoring) at 5 mg/hr and escalate in increments of 1-2 mg/hr with each flare of symptoms. Dosing above 15 mg/hr is likely to be futile and toxic, but I have now seen (though not yet had opportunity to publish beyond abstract form) excellent responses in the large majority (~90%) of a bit more than two dozen patients in whom I've now tried this, and all the responses have occurred in the 10-14 mg/hr range. Obviously, a semipermanent IV line (typically PICC, PASport, or Portacath) needs to be placed prior to discharge if this treatment is found helpful; note that some patients react to the materials in some lines, so sometimes more than one line needs to be tried. Even once an optimal chronic maintenance dose is identified, patients may continue to have occasional flares, for which bolus dosings of 10-25 mg via the pump are usually sufficient to regain control. Of note, in one patient in whom the infusion of preservative-free IV diphenhydramine seemed to trigger flares and who could not tolerate the infusion at more than 8 mg/hr, a trial of a different 's preservative-free IV diphenhydramine instantly resolved the intolerability and resulted in good response within the expected dosing window, thus demonstrating there had to have been an unacknowledged excipient or unrecognized contaminant in the first product tried; indeed, I then discovered at the FDA's website that the first product's  (JOEY) had been repeatedly recently cited by the FDA for contaminated product and inactive product in some of its other IV products and also had been cited for failing to respond to prior citations. (I have since gained similar experience with the allegedly preservative-free IV diphenhydramine product from [facility], too. In all of these few, severely afflicted patients who failed JOEY and [place]  IV diphenhydramine, a trial of similar product from [facility] then proved successful.) In other words, although it is as theoretically possible to develop true allergy to diphenhydramine as to any other medication, ordinarily diphenhydramine is an extremely well tolerated drug, and thus even in a formulation which supposedly contains absolutely nothing but diphenhydramine and water, intolerance should raise more concern for excipient reactivity than diphenhydramine reactivity.    I will further note that I also have (unpublished, limited) clinical experience that addition of continuous famotidine infusion (2.5 mg/hr) to continuous diphenhydramine infusion (CDI) can provide clinically significant further disease control beyond what is seen in some patients with CDI together with intermittent (oral or IV) histamine H2 receptor blocker therapy.  Still, if it is ever determined that CDI is necessary in this patient, I would recommend starting it first and establishing a stable, beneficial dose before adding continuous famotidine (or ranitidine), so that the different benefits from the different infusions can be distinguished.    Once an optimal full histamine receptor blockade has been established (presuming such drugs help at all; note that the heterogeneity of the disease is such that one can't presume *any* mast-cell-targeted medication or medication class to necessarily prove effective in all MCAS patients), the question becomes what to try next. Given the lack of validated biomarkers at present to predict optimal therapy for the individual MCAS patient (see the note in the following paragraph for a bit more discussion on this important point), I tend to start inexpensively and progress by cost as needed (though with occasional, carefully considered exceptions as noted below). Whenever possible, one intervention should be tried at a time, generally starting at a low dose and escalating step-wise in dose or frequency  "about every 1-2 weeks as tolerated so that a maximally tolerated dose and a maximally effective dose and frequency can be clearly identified. If the intervention is tolerated but significant benefit is not clearly identified after 4-8 weeks, the drug should be dropped and the patient should proceed to the next trial. When the medication that is significantly effective for a patient's particular variant of mast cell disease is found, the improvement is often so starkly apparent to the physician as he walks into the exam room at the next check-up in 1-2 months that sometimes words do not even need to be exchanged.    There is an understandable temptation to expect that elevated prostaglandins should rona for likelihood to respond to NSAIDs, or that elevated leukotrienes should rona for likelihood to respond to leukotriene receptor antagonists, or that elevated histamine or histamine metabolites should rona for likelihood to respond to histamine receptor antagonists and/or diamine oxidase, etc. etc. etc., but the reality is that at present there simply are no data demonstrating such relationships. The mast cell is capable of producing and releasing more than 200 mediators, and the few we measure obviously are a very poor surrogate for the totality of the signalling chaos in the disease, plus, as noted above, there are preliminary data suggesting extreme mutational heterogeneity in multiple mast cell regulatory elements in essentially every patient with the disease, all but guaranteeing extreme heterogeneity in patterns of clinical presentation and patterns of therapeutic responsiveness.  Thus, while there's certainly nothing wrong in trying, for example, a leukotriene blocker as an early intervention in an MCAS patient with elevated leukotrienes, one should not draw any inferences at all (about, for example, the accuracy of the diagnosis) if the patient fails to respond to such \"logical\" therapy. The disease is simply " "far too complex for expectations of response to be that simple. Similarly, I should emphasize that the anecdotal observations I offer below (e.g., patients with diffusely migratory pain, especially bone pain in the legs, tend to respond to hydroxyurea, and patients with inappropriately \"normal,\" or even mildly elevated, H/H tend to respond to imatinib) are just that -- anecdotal -- and have not yet been published or otherwise subject to peer review, let alone put through formal evaluation to establish them as \"validated biomarkers.\" All I can offer at this point is my accumulated clinical experience in treating more than 1,000 MCAD patients (the vast majority with MCAS). This certainly will be important research to be done as funding for such can be obtained, but the bottom line at present is that research has not been done, so the physician treating an MCAS patient needs to keep in mind the great limitations on what's currently *reliably* known about MCAS.    On a matter that's different from, but nevertheless somewhat related to, the excipient issue, I should note that if the patient has any known drug-metabolizing-enzyme (DME) polymorphisms (e.g., in cytochrome p450 isozymes 2C9, 2C19, 2D6, or 3A4, all of which can be genotypically tested quite readily in the [place] dosing adjustments will be needed as appropriate for the patient's particular polymorphisms. Like MCAS itself, pharmacogenomic polymorphisms are far more prevalent (by available epidemiologic data) than physicians typically suspect. A poor-metabolizing DME polymorphism should be suspected (and the patient should be appropriately genotyped) if, after the patient has had some time on the drug at routine doses, a toxicity develops which is typically seen only at high doses of that drug; conversely, a rapid-metabolizing DME polymorphism should be suspected (and, again, the patient should be appropriately genotyped) if an ordinarily very reliable drug " "effect (e.g., INR increase with warfarin) is not seen with typical dosing. It is unknown whether there is a relationship between the genetic/epigenetic origins of mast cell disease and the genetic basis of DME polymorphisms, but I certainly have seen many DME polymorphisms in MCAS patients.    Significantly beneficial drugs obviously should be retained in the regimen beyond the trial period. There are no biomarkers at present to identify when the disease has come \"adequately\" under control. It is purely the patient's subjective judgment as to whether sufficient improvement has been attained to preclude, at least for the time being, further medication trials, or not. The goal in this very complex disease is to identify a regimen that helps the patient feel significantly better than the pre-treatment baseline the majority of the time, and with sufficient patience, persistence, and a methodical approach (trying as hard as possible to make just one change in the regimen at a time) exercised by both patient and local treating physician, in my experience most MCAS patients have been able to attain the goal.    Other inexpensive agents to be considered include potentially sedating H1 blockers (e.g., hydroxyzine, doxepin, cyproheptadine, clemastine), NSAIDs (note caveats below), quercetin or other flavonoids, alpha lipoic acid, N-acetylcysteine, vitamin C, vitamin D, benzodiazepines, and even amphetamines and low-dose naltrexone; JULIETA supplements, too, occasionally provide some help.  More expensive agents include leukotriene inhibitors, ketotifen (this is more expensive only in the [place] it usually is very inexpensive in every other country on the planet), cromolyn, pentosan, ivabradine, dronabinol, and hydroxyurea. Like ketotifen, there is another mast-cell-stabilizing/anti-inflammatory agent, too -- tranilast -- readily available in the [place] but only available in the [place] via compounding. Substantially more expensive " "agents include omalizumab and tyrosine kinase inhibitors such as imatinib. I also have seen somatostatin occasionally prove helpful for refractory non-infectious diarrhea in MCAS.  Although there is not yet any reported use of alpha interferon in MCAS, the systemic mastocytosis literature shows that this drug can help reduce, in some patients, not only tumor load but also mast cell activation symptoms, therefore arguing for potential utility of the drug in MCAS (more comments below). Immunosuppressants such as hydroxychloroquine, azathioprine, cyclosporine, rapamycin, sirolimus, tacrolimus, mycophenolate, cyclophosphamide, etc. tend to help little but occasionally show benefit and thus are not entirely unreasonable to try; dosing is individualized, but it is reasonable to start as is typically done in other situations in which these drugs are used. There are theoretical reasons why newer targeted anti-inflammatories (e.g., infliximab, etanercept, adalimumab, etc.) and Janus kinase (NICOLA) inhibitors might be helpful in at least some cases of MCAS, but there have not yet been any reports of ad hoc use of such drugs in MCAS, let alone formal studies. Of note, too, are the (expensive) NK-1 receptor blockers (e.g., aprepitant), which are approved for refractory post-operative or chemotherapy-induced nausea and vomiting but have not yet been case-reported or formally investigated (let alone FDA-approved) in \"mast cell disease,\" yet they have been shown helpful in \"refractory pruritus\" of both malignant and benign/idiopathic etiologies, and one could be forgiven, in my opinion, for suspecting \"refractory pruritus\" is likely a manifestation of mast cell activation; it's interesting that binding of substance P ligand to the NK-1 receptor (which is known to be expressed on the mast cell surface) is known to cause explosive mast cell degranulation.  Again, there simply is no way to predict which medications are most likely to " "help which MCAS patients, and it is the treating physician's best judgment as to the specific sequence of medication trials that will best suit the individual patient. There are no standards established in this matter, and there is no \"right\" or \"wrong\" to trying one medication sooner rather than later.    I typically start a trial of doxepin in an MCAS patient at 6.25-10 mg bid, escalating weekly as tolerated (sedation is usually the limiting toxicity) in 6.25-10 mg bid steps to maximal efficacy or a maximum dose of about 40-50 mg bid. Hydroxyzine can be tried starting at 10-25 mg bid and escalating every 1-2 weeks in steps to  mg bid-tid. Cyproheptadine can be tried starting at 4 mg bid-tid and escalating every 1-2 weeks in steps; maximum dosing typically is 8 mg qid.  Clemastine (which is usually accessible over-the-counter) can be tried starting at 1.34 mg bid, escalating weekly as tolerated in steps of 1.34 mg bid to maximal efficacy or a maximum dose of 2.68 mg bid-tid.    Stimulants such as Adderall (amphetamine-dextroamphetamine) or Ritalin (methylphenidate) are occasionally helpful. Adderall can be started at 5 mg once or twice daily and escalated in steps to as high as a total daily dose of 60 mg. It probably should not be used in patients with a history of substance abuse. Ephedrine starting at 12.5 mg bid may be helpful; it can be stepped up every week or so as tolerated to as high as 150 mg daily in divided doses. Methylphenidate can be tried at 5 mg bid and escalated in steps every 1-2 weeks as tolerated to maximal efficacy or a maximum dose of 20 mg bid-tid (optimally 30-60 minutes before meals); if the drug proves helpful, long-acting (and thus potentially more convenient) formulations are available.    Narcotics often are triggers in MCAS. I try to avoid prescribing narcotics for MCAS patients as much as possible. In my experience and as reported in some literature, tramadol, fentanyl, and " hydromorphone tend to be better tolerated than other narcotics.  I have very limited anecdotal experience, too, that buprenorphine (e.g., the Butrans patch) can be well tolerated and helpful.    NSAIDs can be helpful in some mast cell disease patients but serve as triggers in others. If there is any history of NSAID intolerance, then consideration must be given to having an allergist take the patient through an NSAID desensitization protocol prior to starting a trial of NSAIDs. The NSAID most commonly used in NSAID-tolerant and -responsive MCAS patients is simple aspirin, typically beginning cautiously at 40-80 mg bid and doubling, as tolerated, approximately every 4-14 days to maximal efficacy. I would not push this past 500-650 mg bid, as I have reliably seen intolerable toxicities at total daily doses in excess of 1300 mg/d. Maintenance of 325-650 mg bid dosing requires standing GI prophylaxis in the form of either H2 blocking and/or PPI therapy. I should note, too, that I have seen MCAS patients in whom all standard COX1/COX2-blocking NSAIDs are intolerable but who then tolerate COX2-selective celecoxib (typically 100-300 mg bid) quite well and to good effect. That said, celecoxib has a sulfa moiety and traditional teaching had long been that this drug should be avoided in sulfa-allergic patients, though more recent published experience (e.g., http://www.nejm.org/doi/full/10.1056/DDZRwn047850) suggests this is an insignificant consideration and it's OK to try celecoxib in sulfa-allergic patients.    Quercetin, a flavonoid, can be obtained over-the-counter and is typically started at 250-500 mg bid and escalated in dose or frequency every 1-2 weeks as tolerated in steps of 250-500 mg bid to maximal efficacy or a maximum dose of about 1000 mg tid. If there are hints of a response, a more water-soluble (and thus better absorbed and sometimes more effective) form of this drug, quercetin chalcone, can be tried  (typically at about half the dose of quercetin).    Alpha lipoic acid is typically started at 100-300 mg bid and escalated every 1-2 weeks as tolerated to maximal efficacy or a maximum dose of about 300-600 mg bid. In my experience this has tended to benefit sensory neuropathy more than other symptoms, but I also have seen an occasional case in which it provided garnett global improvement.    N-acetylcysteine is typically started at 300-600 mg bid and escalated every 1-2 weeks as tolerated to maximal efficacy or a maximum dose of about 600-1200 mg bid. In my experience this benefits few patients (most commonly those with presyncopal issues), but sometimes its benefits are global and garnett.    Vitamin C has been repeatedly shown to inhibit mast cell production and release of histamine. It's typically dosed at 750-1000 mg in a once-daily slow/extended-release form, though I've had one patient respond well at just 500 mg once daily, and some patients report use of such a product bid helps more and appears sustainably tolerable and effective.    Although vitamin D has not yet been reported to have helped any form of human mast cell disease, I will note that a few of my MCAD/MCAS patients who have been prescribed vitamin D supplements by their other physicians to address osteopenia/osteoporosis have reported improvement (soon after beginning the supplement) in symptoms which almost certainly are driven by their MCAS, and a potential direct mechanism for such a response is clear since (normal and malignant) mast cells are known to bear cell-surface vitamin D receptors and since engagement of that receptor by vitamin D (calcitriol) clearly results in activation of various intracellular pathways that result in decreased inflammation as shown by a number of measures. As such, and again with the understanding that there are no formal studies of such, vitamin D supplementation in moderation (~1,000 IU per day) would seem to be a  "not unreasonable, and almost certainly non-toxic (excipient issues notwithstanding) and inexpensive, intervention to try in MCAS, especially in those in whom vitamin D deficiency is identified. Of note, patients who are severely deficient in vitamin D at baseline probably initially need more aggressive supplementation (e.g., ~50,000 IU weekly for 6-8 weeks) before pursuing the above-noted daily supplementation.    I have heard of mast cell disease patients who have improved with low-dose naltrexone but have seen significant improvement in only one patient thus far. Dosing typically is in the range of 1.5-6 mg/d, though some patients may find split dosing bid more helpful.    JULIETA supplements are occasionally helpful and are typically dosed in terms of histamine-degrading units (HDUs), e.g., the HistDAO product is dosed as 20,000-40,000 HDUs (1-2 capsules), preferably 15 minutes before meals, especially meals with known higher histamine content.    Speaking of meals, it should be noted that some patients find \"low-histamine diets\" (the specific nature of which seems to vary substantially from one author to the next) helpful, other patients find other diets helpful, and most patients find diets of any sort generally unhelpful.  In general, dietary interventions should be viewed as exercises attending to \"Step 1\" above, i.e., identify triggers as precisely as possible, and then avoid them.  In other words, if a high-histamine-content food such as cheese or wine is found to reliably trigger a flare of symptoms, then that food should be avoided.  All one can do with regard to diet is avoid extreme diets and consider each dietary intervention as, well, an intervention akin to a medication intervention: if it has not clearly provided significant benefit after about a month, it should be abandoned and the patient should move on to another intervention/trial.    Also with regard to dietary challenges, more severely afflicted " "MCAD/MCAS patients sometimes have so much difficulty tolerating a very wide range of foods that \"safe\" oral intake is reduced to an extremely limited dietary range, and sometimes it unfortunately becomes the case that simply no oral dietary intake at all is tolerated.  In such patients, it is important to remember that not every dysfunctional mast cell in the body of an MCAS patient is dysfunctional in the same way as every other dysfunctional mast cell in that patient's body, and it often is the case that mast cells in different sites in the patient's body -- even in different segments of a given organ/system -- will be dysfunctional in different ways or degrees.  As such, even though the mast cells in the proximal GI tract (mouth, throat, esophagus, perhaps even stomach) may be so widely reactive as to preclude tolerable ingestion of any dietary material, it remains possible that the mast cells in the small and large bowel may still tolerate dietary material and permit absorption of nutrients.  In other words, in some MCAS patients who simply can no longer eat, a PEG, PEJ, or PEG-J tube may permit continued enteral feeding, a far better approach to nutrition than parenteral nutrition (which indeed I have seen become necessary in a very few MCAS patients).  Although I have seen occasional patients unfortunately react to such tubes themselves (requiring replacement with tubes constructed of alternative materials/plastics), in my experience most MCAS patients who come to require tube feeding adequately tolerate the tube (and the placement procedure), and then the question becomes which formula to use.  I have seen a wide range of formulas prove successful -- and unsuccessful -- in MCAS patients, and only a patient, trial-and-error process will prove successful in the end (though, again, a very occasional patient proves intolerant of all available/accessible formulas and thus comes to need parenteral nutrition, with " "the expected much higher complication rate).  In my experience, Neocate Jr. and Elecare Jr. have been the most consistently (but, again, not universally) tolerable formulas.    Benzodiazepines -- typically at low doses but given regularly because of the short half-lives of most of the drugs in this class -- also can be helpful, likely because they address the inhibitory mast-cell-surface benzodiazepine receptors. (Of course, they also address similar neuronal receptors, and given the physical proximity of mast cells and neurons in many tissues and the extensive \"cross-talk\" between these two types of cells, it should not be surprising that \"calming the nerves\" can bring about a useful downregulating effect on mast cells independent of whatever direct downregulating effect such a drug might have on mast cells via the mast-cell-surface benzodiazepine receptor.) Lorazepam is a reasonable choice, and even though clonazepam clearly binds to the mast-cell-surface benzodiazepine receptor less well than lorazepam, some patients respond well to clonazepam, too, but these drugs have short half-lives, so any benefit she gains from them will wear off fairly shortly, thus justifying regular dosing if it is going to provide her as much help as possible. (It is for this pharmacokinetic reason that some MCAS patients who benefit from lorazepam or clonazepam clearly do better at tid dosing than bid dosing.) I typically start lorazepam or clonazepam dosing at 0.25 mg bid, escalating every 1-2 weeks as tolerated in 0.25 mg bid increments to maximal efficacy or a maximum dose of 1-1.5 mg bid-tid. An occasional patient will even do remarkably well at just 0.125 mg bid, so there's nothing wrong with starting at even that cautious a dose.  Sometimes diazepam, because of its longer half-life, comes to be identified as the preferred agent of this class in the individual patient. Midazolam is usually an excellent choice for perioperative " management.    Steroids of course are inexpensive and often helpful in settling acute flares of mast cell disease, but their long-term toxicities make them unattractive as chronic treatment. Of note, though it's virtually impossible for a steroid to be allergenic (just as with H1 and H2 blockers as discussed above), non-IV steroid injections (as given in painful joints, for example) often contain excipients and/or -michael anesthetics which indeed can be provocative to MCAS patients. The full ingredient list of any steroid injection should be carefully reviewed in advance -- and then re-reviewed if a reaction develops.    The mast cell's  repertoire includes a number of leukotriene moieties, and just as one can't predict which benefits might be seen with other medications tried in this disease, one shouldn't assume that there can't be any benefits beyond the respiratory tract from this traditionally asthma-targeted drug. The leukotriene receptor blocker that's usually tried first is montelukast. In my experience, MCAS patients who respond to montelukast usually respond better to twice-daily dosing (10 mg bid) than once-daily dosing; occasional patients respond even better at 20 mg bid.  I have not seen more benefit with dosing of the leukotriene receptor blockers more frequently than bid. (Some patients clearly do better with brand-name Singulair vs. generic montelukast, or vice versa, likely due more to excipient issues than anything else.) Leukotriene synthesis inhibition can be tried, too, with zileuton, which has a short half-life such that 600 mg qid dosing generally is recommended over 1200 mg bid dosing. However, an extended-release formulation of zileuton is now available; dosing is 1200 mg bid. Liver function tests should be checked at baseline and then monitored monthly in the first quarter and then quarterly when starting zileuton.    Ketotifen is available very cheaply in every country on the  planet except the [place] where it is available in oral form only via compounding, which of course incurs substantial cost. It is commonly started at 1 mg bid, escalating every 1-2 weeks as tolerated in steps of 1 mg bid to maximal efficacy or a maximum dose of 4-6 mg bid-tid. Ketotifen eyedrops can be helpful for the eye irritation frequently seen in MCAS, and this is the sole form of ketotifen that is available in the [place] through standard commercial pharmacies. Demonstration of efficacy of ketotifen in one form is often a sign that the other form will be effective, too. In my experience, different compounding pharmacies have quoted vastly different prices for compounding oral ketotifen; patients are advised to obtain multiple quotes. Although I do have some patients who have chosen to import inexpensive commercial oral ketotifen formulations from foreign pharmacies, given the many concerning reports of poor quality of some medication products obtained through some foreign pharmacies, I do not recommend my [place] patients obtain through a foreign pharmacy any pharmaceutical which can be legally (even if more expensively) obtained through a [place] pharmacy. Especially given the legal protections afforded [place] patients who obtain medications through [place] pharmacies -- protections which may be partly compromised or even non-existent in dealing with foreign pharmacies -- foreign-sourcing of pharmaceuticals is a risk calculation to be made entirely by the patient.    As mentioned above, tranilast is readily available in the [place] but only available in the [place] via compounding. Dosing typically is 200 mg tid.    Especially in view of how the same serotonin reuptake elements present on neurons are also present on the surface of the mast cells, selective serotonin reuptake inhibitors (SSRIs) can be helpful in some MCAS patients both through neuronal binding and mast cell binding. Dosing of SSRIs in MCAS is similar to dosing  "of these drugs for their approved indications. Of note, physicians who prescribe SSRIs in MCAS patients must be alert to development of, and know how to manage, serotonin syndrome, whose presentation can easily be confused for a flare of mast cell activation.    Cromolyn is not absorbed to any significant extent (there is controversy as to whether this holds true in the pulmonary tree) and thus does not circulate systemically to any significant extent but still can be helpful in its OTC nasal spray (Nasalcrom) and eyedrop (Opticrom) formulations as well as when inhaled or swallowed. (A cream for topical cutaneous use can be compounded at home, too, using a bottle of Nasalcrom and various skin creams such as Eucerin; recipes can readily be found on-line.) The oral form is typically started as 100 mg (one ampule) twice daily (preferably, but not necessarily, 30 minutes before meals) and escalated every 1-2 weeks in dose or frequency as tolerated to maximal efficacy or a maximum dose of 200 mg qid, though a few patients have told me that 300-400 mg qid has provided them optimal benefit from the drug and that it's unhelpful for them at lower doses. The nebulized form is typically started at 20 mg bid and escalated every 1-2 weeks as tolerated to maximal efficacy or a maximum dose of 20 mg qid. In a minority of patients, initiation of cromolyn causes an initial mild-moderate flaring of disease that usually can be managed with simple extra dosings of \"rescue medications\" (e.g., H1/H2 blockers, possibly also benzodiazepines and/or steroids). Such flares typically settle after 3-7 days, but if flares are severe or appear to be coursing chronically rather than settling, then the drug should be stopped unless it is the generic formulation that was initially dispensed, in which case a trial of brand-name Gastrocrom should also be pursued, as I have seen a number of MCAS patients who find the generic formulation intolerable but " "who then tolerate, and respond well, to Gastrocrom (in spite of both products bearing identical ingredient lists of just cromolyn and sterile water), obviously implicating the presence in the generic product of an excipient of some sort (whether known to the  or not) which is an offending/triggering excipient in at least some MCAS patients. Importantly, cromolyn is not absorbed, so local and distant benefits seen from successful inhibition of mast cell activation at one site by one form of cromolyn (e.g., oral) may be different from local and distant benefits seen from additional application of cromolyn at a different site (e.g., the sinonasal tract, with nasal cromolyn spray).    Pentosan is typically used to help settle mast cell activation in the  tract (e.g., when the sterile inflammatory symptoms of \"interstitial cystitis\" -- frequency, urgency, dysuria, etc. -- are present). Dosing is 100 mg bid-tid and liver function tests must be monitored (typically monthly in the first quarter and quarterly thereafter).    Long available in [place] until finally gaining approval in the [place] in 6/15, ivabradine is officially approved for heart failure but can be helpful in MCAS, principally for patients particularly bothered by tachycardia. The approved initial dosing of 5 mg bid often is excessive for MCAS patients, and starting instead at just 1.25 mg once daily is probably more appropriate, escalating every 1-2 weeks as tolerated (bradycardia often is the limiting symptom) to maximal efficacy or a maximum dose of 7.5 mg bid.    Much as how benzodiazepines can downregulate neurons and mast cells via inhibitory cell-surface benzodiazepine receptors, dronabinol can downregulate neurons and mast cells via inhibitory cell-surface cannabinoid receptors. Dronabinol dosing typically begins at 2.5 mg bid and escalates every 1-2 weeks as tolerated in steps of 2.5 mg bid to maximal efficacy or a maximum dose of around " "5-7.5 mg bid-tid. The key compound desired in the cannabinoids, of course, is cannabidiol, not the \"high\"-producing THC. I have heard from occasional MCAS patients who report cannabidiol oil (now legally accessible in certain localities) to be helpful. It is difficult to recommend smoking of cannabis due to the many other toxic compounds in smoke, which in general is a mast cell activator.    Hydroxyurea has been recognized for decades as capable of settling mast cell activation in some patients. The dosing needed to achieve such effect is typically low, starting at 500 mg once daily and escalating after 2-4 weeks as tolerated to maximal efficacy or 1000 mg once daily (though some patients report better effect at 500 mg bid). Patients who react acutely and severely to the \"Hydrea\" 500 mg formulation likely are reacting to excipients, and I have found most such patients (once recovered after having stopped the medicine) subsequently tolerate and respond well to an alternative 200 mg \"Droxia\" formulation, with optimal dosing usually working out to be in the 600-1000 mg total daily dose range. In my experience, hydroxyurea has been particularly useful for treating the diffusely migratory soft-tissue/bone aching/pain commonly seen in MCAS.    Perhaps due directly to the IgG receptors on the surface of the mast cell, and/or perhaps due to other, less direct mechanisms, IV immune globulin (IVIG) helps some MCAS patients (who often first come to IVIG therapy via diagnosis (prior to diagnosis of MCAS, of course) with \"combined variable immunodeficiency\" or other, typically poorly defined immunodeficiency syndromes), principally (per my unpublished observations) in reducing fatigue for 1-2 weeks, in accordance with the half-life of human antibodies. Due to the drug's modest and brief benefits and great expense, I am not much of a fan of using IVIG for treating MCAS, and I suspect most patients being treated as such likely " can find more medically and financially effective therapy. Still, when most or all other options have been exhausted, it is not an entirely unreasonable option from a biological plausability perspective, though I should note I'm unaware of any published literature (even case reports) actually supporting this particular use of IVIG.    As noted in many case reports now in the peer-reviewed literature, the anti-IgE monoclonal antibody omalizumab appears to benefit some patients with MCAS, utterly independent of the pre-treatment IgE level. Dosing typically begins at 150 mg subcutaneously once every four weeks and escalates in steps as high as 300 mg subcutaneously every two weeks. In counseling patients about the risks of the drug, they should be advised of the 0.1% risk of fatal anaphylaxis mentioned in the product insert, and it is important to follow the 's recommendations to observe the patient in the infusion suite for 2-3 hours after each of the first three injections and then at least 30 minutes after each subsequent injection. In my experience, omalizumab tends to be more helpful for MCAS patients with prolific allergies, but its great cost (list price approximately US$30,000/year) needs to be considered when deciding whether to try it ahead of less expensive therapies. Before starting omalizumab, it may be worthwhile checking an anti-IgE IgG level, as an elevated level may indicate the presence of a true autoimmune-mediated mast cell activation for which rituximab (see below) might also be worth trying. Again, if this is going to be checked, it needs to be checked before starting omalizumab since the drug will confound the test and the patient will have to be off the drug at least a year before a reliable native anti-IgE IgG level can be determined again.    Similar cost-benefit calculation is required regarding the tyrosine kinase inhibitors. The prototypical tyrosine kinase inhibitor imatinib  "(~[place]$100,000/year in the [place] for name-brand \"Gleevec\" from [employer], or ~US$60,000/year for the newly available generic imatinib from [employer]) is believed to target and block/inhibit some of the constitutively activating mutations in KIT suggested by some of the published research to be present in virtually every MCAS patient. 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 Imatinib-sensitive mast cell disease tends to be sensitive to low doses of the drug. Imatinib is typically managed by a hematologist/oncologist. A starting dose of 100 mg once daily is appropriate, escalating after one week (if tolerating it OK but unimproved) to 200 mg which typically is given once daily, though I have seen occasional patients report substantially better effect at 100 mg bid or 50 mg tid-qid dosing. In my experience now in seeing this drug control MCAS to a significant degree in several dozen patients, the \"sweet spot\" for dosing tends to be a 200 mg total daily dose (most do fine with once daily dosing), but I do have a few patients who have clearly identified that higher doses (300-600 mg total daily dose) definitely serve them better. In my experience, imatinib has tended to provide substantive benefit in MCAS patients manifesting relative or absolute erythrocytosis, perhaps as a consequence of constitutive KIT activation in turn driving JAK2 activation. (Of course, activated KIT will drive activation of the other, inflammation-driving JAKs, too, suggesting a potential role for the typically promiscuous NICOLA inhibitors in controlling at least some of the symptoms in mast cell disease. Indeed, I have already seen (but not yet published) excellent responses in mast-cell-driven symptoms from ruxolitinib in myelofibrosis and polycythemia vera patients and from tofacitinib in rheumatoid arthritis patients.) Absolute erythrocytosis, " "of course, is easy to recognize (hemoglobin, hematocrit, and/or red cell count above the upper limit of normal), but a relative erythrocytosis is more difficult to recognize, manifesting as a \"normal\" H/H in patients with marked multisystem inflammation which one would expect to cause a secondary anemia of chronic inflammation. A chronically inflamed MCAS patient's \"normal\" H/H may be evidence of the abnormal \"pressure\" being exerted on marrow to overproduce red cells, and this finding, set against a history and exam strongly suggestive of significant chronic multisystem inflammation, hints that a trial of imatinib might be appropriate sooner than its great cost might otherwise warrant. In patients with trying economic circumstances, sometimes the 's patient assistance program needs to be engaged in order for the patient to be able to access imatinib. Additional manufacturers are expected to make generic imatinib formulations available beginning in August 2016 when [employer] loses its exclusive license for marketing generic imatinib, and the price is expected to fall dramatically further at that point. Patients experiencing difficulty tolerating [employer]- and [employer]-branded imatinib may find future formulations of imatinib, using different excipients, more tolerable. It remains to be seen whether imatinib will be made available by any  for compounding.    As inferred above, this patient's chronic multisystem inflammation would be expected to cause at least a mild anemia of chronic inflammation, so the \"normal\" H/H in this patient may represent a modest relative polycythemia, thus potentially arguing for a trial of imatinib somewhat earlier in the sequence of therapeutic efforts than its extraordinary cost ordinarily would warrant. (One wonders whether the polycythemia in MCAS comes dominantly from constitutive activation of JAK2 caused by (mutated), constitutively activated KIT " (various mutant forms of which imatinib and the other tyrosine kinase inhibitors can address).)    I also have seen low-dose imatinib (again, most commonly 200 mg/d) quickly bring complete resolution to the lipodystrophy (along with great improvement in many other symptoms) in a number of patients who were diagnosed with Dercum's disease before later being discovered to have MCAS as their unifying diagnosis. I also have seen low-dose imatinib quickly bring complete resolution to very severe, otherwise refractory hypertriglyceridemia, similarly suggesting there is some pathway by which MCAS can generate such a dyslipidemia.    Some patients who do not respond to imatinib go on to respond well to other TKIs such as dasatinib or nilotinib. As with imatinib, usually only low doses (e.g., 20-40 mg of dasatinib daily) are needed in such patients. Although there are a very few cases reported in the literature of nilotinib helping to control mast cell disease, I myself have seen no successes with this drug in the few patients in whom I have tried it; in fact, I have seen acute intolerance in about half the patients in whom I have tried it, again suggesting an issue with reactivity to the medication product's excipients rather than nilotinib itself. I also am aware of a few cases (one recently published in the  Journal of Haematology) in which low-dose (12.5 mg/d) sunitinib was given for very severe MCAS and rapidly achieved complete response (1 case) or very good partial response (a few additional cases) without any apparent toxicity (now sustained about two years in the published patient with a complete response). (There also is a published pre-clinical report in a rat model providing rationale for trying sunitinib in mast cell disease.)    In general, I have observed low-dose dasatinib to be more effective for CNS-related symptoms (e.g., profound, ultra-refractory depression in one patient) and low-dose sunitinib  to be more effective for patients whose MCAS phenotype features a strong allergic/anaphylactoid component. Although dasatinib has a propensity at CML-level dosing (~100 mg/d) to drive effusions, I have never seen such problems develop at the 20-40 mg/d dosing level. Still, in a patient with significant pre-existing pulmonary disease, the potential for pulmonary complications would have to be watched especially carefully if dasatinib were to be started.    I have occasionally seen somatostatin prove helpful for refractory non-infectious diarrhea in MCAS.  Usually the long-acting form of the drug (Sandostatin LAR) is used, with dosing typically in the 10-30 mg range (taken subcutaneously every four weeks).    I have heard from an occasional colleague (though not seen reported yet) that ursodiol (standard dosing 300 mg bid) can be helpful for GI symptoms in an occasional MCAS patient, though to date I myself have seen such improvement in only a single patient.    As inferred by its very name, MCAS is a disease principally of inappropriate mast cell activation, not inappropriate mast cell proliferation. As such, it would seem there is little call in the management of MCAS for the antiproliferative treatments used to treat mastocytosis such as cladribine and interferon. Both drugs have substantial toxicities which also would question their utility in MCAS, principally cytopenias and immunosuppression with cladribine and a flu-like syndrome with interferon which in my experience often don't tachyphylax as significantly or briskly as the manufacturers typically promote. Alpha interferon also bears risks for significant cytopenias, hypothyroidism, and depression. Nevertheless, downregulation of mast cell activation has been seen with alpha interferon.  Thus, one wonders whether a cautious trial of the drug might be worthwhile at some point in otherwise refractory MCAS patients, particularly those whose medication excipient  "intolerance issues preclude trials of most oral medications. I am unaware of any peer-reviewed literature reporting the use of cytotoxic chemotherapy such as cladribine, or any type of interferon, in the treatment of MCAS. There are preclinical data suggesting CD30-targeting brentuximab may be helpful for cytoreduction and  release downregulation in mastocytosis, but there has been no clinical testing yet in that disease, let alone in MCAS.    Urgent/emergent management of flares of mast cell disease generally include extra dosings (IV if possible) of H1 blockers (e.g., diphenhydramine  mg) and H2 blockers (e.g., famotidine 20-60 mg), possibly also with glucocorticoids (e.g., methylprednisolone 1-2 mg/kg) and possibly also with benzodiazepines (e.g., lorazepam 1-3 mg). Patients are advised to try different formulations of rapid-acting antihistamine products (e.g., liquid formulations, or rapid-dissolving formulations such as Claritin Reditab or Zyrtec Fast-Melt) to identify what is tolerable and works well for them and then to regularly carry such products with them if they frequently suffer flares. Of course, epinephrine is always the \"go-to\" drug for anaphylaxis unless the patient is on a beta-blocker, in which glucagon is the \"work-around\" drug. If a mast cell patient has suffered repeated jeff anaphylaxis or severely anaphylactoid states, particularly if the triggers are unclear, it is recommended that the patient always have immediate access to two doses of epinephrine (or glucagon, as just noted), usually via an Epi-Pen or equivalent device. Although The Mastocytosis Society and some experts recommend that *all* patients with (any form of) mast cell disease always carry epinephrine autoinjectors, it is unclear to me -- especially as we are increasingly learning how large and diverse the MCAS population truly is -- whether such a recommendation should indeed be made so globally. Though " anaphylaxis obviously can be quickly fatal, and though anaphylaxis is always a risk at some level in (diagnosed and undiagnosed) mast cell disease patients, past behavior of MCAS largely predicts future behavior (at least until a new major stressor occurs), so in my opinion it is acceptable for patients who have never anaphylaxed (or perhaps anaphylaxed only a long time ago to a specific agent which they have since avoided and are likely to continue to be successful in avoiding) to weigh their small (but non-zero) risk for anaphylaxis against the costs, in all the meanings of that word, of carrying epinephrine autoinjectors on their persons for the rest of their lives. Some patients feel Epi-Pen Twinjectors or other autoinjector devices are more convenient than standard traditional Epi-Pens; efforts should be made to accommodate the preferences of the patient who will need to carry the device(s) with him/her for the rest of his/her life. The indications for usage of an epinephrine autoinjector is when the patient can't breathe or can't swallow, and it's important that patients be instructed in the proper use of such a device (in general: call for help; lie down flat; inject in one lateral thigh (through clothing is OK) (unless the instructions with her specific device direct an alternative injection approach), then inject again in the contralateral thigh if unimproved after five minutes and help hasn't yet arrived). For obvious reasons, it's very important for patients to read the instructions for their epinephrine autoinjectors as soon as they receive them, and preferably for them to even have a chance to simulate use with a dummy device.    Another drug primarily used for emergent management of MCAS -- particularly in patients with refractory angioedema -- is icatibant (30 mg, injected subcutaneously in the abdomen). The drug can be extremely effective very quickly, but its extreme expense (approximately US$7,000  "per dose) perhaps calls for at least a brief trial of routine emergent management (e.g., antihistamines, steroids, benzodiazepines) before resorting to this option.  One must also be cognizant of the fact that icatibant is in the class of peptidergic drugs, and data have begun emerging in the literature suggesting that this class of agents may pose as triggers in some MCAD/MCAS patients.    I'll also note that there have now been a very few case reports in the literature of rituximab working very well to control (at least for several months) otherwise refractory \"idiopathic anaphylaxis\" (IA), which in my opinion almost certainly is a manifestation of MCAS. There also are small studies showing efficacy of immunosuppressive approaches such as with rituximab or cyclophosphamide or cyclosporine in treatment of \"chronic (auto)immune urticaria\" (associated with anti-IgE and/or anti-IgE-receptor autoantibodies).  It is likely that IA and CIU are manifestations of MCAD/MCAS, and thus, whether one applies one of these diagnostic labels or another to such patients, I feel rituximab (or other immunosuppressive approaches as briefly suggested above) are reasonable, but patients and their treating physicians need to be cognizant that rituximab is not curative and is expensive and that it takes roughly one year to reconstitute B-cell immunity after rituximab treatment. I'll also note that it's the treatment successes, not failures, that tend to get reported in the case literature, and I have heard from some physicians that their trials of rituximab for a few of their IA/MCAS patients have shown no benefit at all.  I, too, have seen more failures of immunosuppressant treatment of MCAD/MCAS than successes, but the occasional success of course is gratifying when many other treatments have failed.    It is extremely difficult to know what to make of, and what to do with/about, this patient's elevated anti-IgE-receptor antibody level, " "which may -- or may not -- be contributing to chronic mast cell activation.  (Note that autoimmunity is frequently \"spun off\" from/by mast cell disease, and in my experience most such autoantibodies are pseudoantibodies or \"mimicking\" antibodies, i.e., specific enough to react with the corresponding probe but insufficiently specific to actually cause the disease with which the antibody is associated.)  There simply is no testing available that can distinguish clinically active mast-cell-targeting autoantibodies from non-pathogenic pseudo-mast-cell-targeting autoantibodies.  My clinical intuition in this case is that it would be better to defer trials of immunosuppressive therapies directing at suppressing autoantibody production until the patient has proven refractory to a large array of other therapies (including imatinib).    Perioperative management is similar to emergent management. The surgeon and anesthesiologist should be aware of the patient's mast cell disease and should read any of the many treatises in the literature on the subject (a link to one such article is provided below). Perioperative guidance for professionals is also available on the website of The Mastocytosis Society at http://www.tmsforacure.org.    A medical alert bracelet is often helpful to emergency personnel. As most such personnel are not presently trained regarding mast cell disease, the first word on the bracelet should be \"anaphylaxis.\"    Although it's likely that most or all of an MCAS patient's various problems are due directly or indirectly to the MCAS, I'll also note the imperative that MCAS patients not assume that major exacerbations of prior problems, or emergence of new problems, are necessarily due to their mast cell disease. Serious illness should always first undergo standard evaluation as appropriate for the particulars of that illness, and only if no other etiology is identified, and if the problem is one that can " "reasonably be attributed to mast cell disease, is it then appropriate to attribute the problem, by default, to the mast cell disease. Even if an identified problem is indeed attributable (directly or indirectly) to mast cell disease, any standard/classic therapy that exists for the problem should be applied (concurrently with MCAS-directed therapy). For example, mast cell disease can cause myocardial infarction via multiple mechanisms, but standard myocardial infarction therapy is certainly the priority over mast-cell-directed therapy for any myocardial infarction being driven by mast cell disease. The tendency of an MCAS patient to blame all ills directly on the mast cell disease, and the desire in such patients to focus solely on MCAS-directed therapy, may be understandable, and MCAS patients typically have so often been disserved in urgent and emergency care facilities that their desire to avoid them may also be understandable, but staying home and trying to initially treat acute severe chest pain with Benadryl is obviously a Very Bad Idea that could easily and quickly prove fatal.    It's so important that I will say it again: there is no method at present to provide better guidance as to which medications should be tried sooner vs. later, there is no \"right\" or \"wrong\" decision as to which intervention to try next, and there simply is no substitute for patience, persistence, and a methodical approach -- on the parts of both the patient and the physician -- in the journey toward identifying optimal therapy for the individual patient. Some patients are so mckinley as to identify substantially helpful therapy within the first few months of beginning the journey; other patients require years, and trials of more than a dozen medications, before a truly beneficial one is found. Enrollment in clinical trials should be considered when such trials become available.    Mast cell disease often drives bone changes (far more " "often osteopenia or osteoporosis than osteosclerosis, but sometimes both osteopenia/osteoporosis and osteosclerosis at different sites in the same patient at the same time). It is reasonable to check baseline bone densitometry upon diagnosis of a mast cell disorder if not already recently done. If the patient is found to be osteopenic or osteoporotic, routine vitamin D and calcium treatment should be tried first, but if follow-up bone densitometry proves such basic treatment to be unhelpful, then bisphosphonate or anti-RANKL therapy is warranted. (I'll note I've often seen MCAS patients prove intolerant of bisphosphonates, but I've never seen bisphosphonate-intolerant MCAS patients prove intolerant of (or unresponsive to) denosumab.) Of course, the single best approach to managing bone disease consequent to MCAS is to control the MCAS as best as possible. I should also note that use of bisphosphonates or denosumab requires pre-treatment clearance by a dentist due to the possibility of these drugs causing (potentially quite morbid, even fatal) osteonecrosis of the jaw (ONJ) in patients with poor dentition or recent dental work.    A priori, the odds of eventually achieving the typical (palliative) MCAS management goal (of finding a regimen that will help the patient feel significantly better than the pre-treatment baseline the majority of the time) in any given MCAS patient are as good as in any other patient (that is to say, pretty good), but only time will tell the ultimate outcome for the individual patient.    In case the following might be helpful, here are a few references to peer-reviewed literature and other educational material about MCAS (authorial bias acknowledged):    1. A short review that provides the \"Lynettes 2011\" diagnostic criteria: http://www.[email or web address]/content/[email or web address]. Also, the Valent 2012 criteria are available at [email or web address], but I'll note " I strongly favor the [place] 2011 criteria over the Valent 2012 criteria since in my experience the latter fit the observed and reported biology of the disease less well than the former.    2. An updated short review from 2014: [email or web address].    3. An approach to diagnosis from 2014: [email or web address].    4. A comprehensive review chapter from 2013: [email or web address]_[email or web address]?products_id=76986.    5. A transcribed grand rounds (including slides) from 2011 ([facility]): [email or web address]://[email or web address].    6. A video of a grand rounds from 2014 ([facility]): see the August 6, 2014 entry under Allergy Webinars at [email or web address].    7. There are many reviews in the peer-reviewed medical literature about perioperative management of mast cell disease (all focused on mastocytosis, but the principles are the same for MCAS). At present, the most recent is Dewachter et al., Anesthesiology 2014, DOI 10.1097/ALN.[id number] (this may be freely available to some at [email or web address].pubs.[email or web address]/article.aspx?[email or web address]). A shorter discussion of perioperative management is available from The Mastocytosis Society at [email or web address]://[email or web address]/documents/ChroniclesSE.pdf.    8. I should note, too, that given the fundamental precept that mast cells are present, and contribute to regulation of function, in *all* systems/organs/tissues, it is most certainly the case that MCAS can cause a wide range of neuropsychiatric symptoms and disorders, too. Prior to diagnosis, most MCAS patients are thought by at least some of their providers, family, and acquaintances to be psychosomatic. Along with a number of co-authors, I recently published a review of the neuropsychiatric aspects of MCAD. It's not freely  "available, but the access point is http://www.sciencedirect.com/science/article/pii/[email or web address]. I'm happy to provide a copy of this for private use upon request.    9. My early experience with continuous diphenhydramine infusion is available as an American Society of Hematology 2015 abstract at [email or web address].    10. A comprehensive review of pharmacologic therapy of systemic mast cell activation disease has been published recently (Sanjiv SANCHEZ et al., Pharmacological treatment options for mast cell activation disease, [facility] 2016 Apr 30, DOI: 10.1007/n45150-061-3933-4).    In summary, I think there are many therapeutic directions that could be legitimately pursued in this patient, and there simply are no data at present to say, or even suggest, that any one particular sequencing of medication trials is \"more right\" or \"more wrong\" than any other sequencing. Determination of a particular sequence to be pursued in this patient will depend on the treating provider's best clinical sense of the matter as influenced by present and emerging symptoms and findings, patient desires, and treatment accessibility (i.e., vis-a-vis third-party payer coverage). Again, there is no \"right\" or \"wrong\" decision here regarding which medication to try next. I can only emphasize the importance of having the patience to make just one change in the regimen at a time whenever possible, and in truth the only \"wrong\" decision that can be made here is to stop trying altogether (unless, of course, the patient decides, for whatever reason, she just does not want to pursue any more medication trials).    All of the above having been said, I think that given the particulars of her presentation, it might be best to focus aggressively at first on identifying an optimal full (H1 + H2) histamine receptor blocker regimen for her.  Beyond antihistamines, in view of her elevated " "urinary N-methylhistamine levels, vitamin C supplementation and/or JULIETA supplementation might be helpful.  Given her inflammatory issues, trials of NSAIDs (e.g., aspirin first, then celecoxib if intolerant of or unresponsive to aspirin) would be reasonable. For GI tract issues, medications beyond antihistamines that might be helpful include (but certainly are not limited to) oral cromolyn, montelukast, compounded oral ketotifen, and low-dose benzodiazepines.  If oral cromolyn is helpful, other forms of cromolyn (e.g., nasal, ophthalmic, nebulized) might also help.  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  Clearly, there are many options, and it will be important for her to try just one at a time as much as possible, and it also will be best for her to work principally with just one of her physicians in stepping through trials of various medications for this disease, though other local consultants certainly can be engaged as necessary for trials of select medications with which her lead physician may be unfamiliar or otherwise uncomfortable in personally prescribing (e.g., imatinib).    The previously established follow-up plan remains sufficient. The patient understands that out of professional courtesy I will not \"cold-call\" or \"cold-email\" any health care provider about her. However, I will be happy to respond to any provider's *direct* request to me for additional input in her case. I can best be contacted either via e-mail at [email or web address], or a phone call can be " "scheduled via office staff here at [phone], or I can be paged via our [facility]'s paging  at [phone] if emergency consultation is felt necessary.  Also, my clinic nurse coordinator, Stefani Chaves RN, can be contacted at [phone]; patients wishing to contact her electronically should preferentially use the \"[facility] Inchart\" portion of the electronic medical record system here. Of note, at present I do not provide phone-based (or video-based) follow-up visits to patients, and the phone numbers I've provided here are intended for use by the patient's physicians and other providers. A patient who calls me to report new problems (or severe exacerbations of old problems) and request guidance (or request provision of guidance to her local provider) should expect me to redirect her to her local providers to first be properly assessed in standard fashion for the particular problems and complaints being experienced at that time, and if, after such evaluation has been performed, the provider desires to discuss the situation further with me, I will be happy to engage in that dialogue if, again, the provider directly requests my input. As stated above, mast cell disease does not render one immune to other diseases, and the patient and all of her providers must remain vigilant to the possible emergence of other illnesses (whether ultimately attributable to her mast cell disease or not) for which standard (non-mast-cell-directed) treatments have been defined.    I spent another two hours writing this addendum, but as this was not face-to-face time with the patient, no additional billing was submitted.      Typed, reviewed, and electronically signed by: Baudilio Avila M.D.     DT: 02/11/2017 04:25 PM    cc: 1. Sheryl Leventhal, M.D. ([facility], [address] [place] [address], [phone], fax [phone])  2. Please also mail a copy to the patient at her home " address as listed in the system.    Baudilio Avila MD

## 2017-01-18 NOTE — Clinical Note
"2017       RE: Argenis Aldana  [address] [place] [id number]     Dear Colleague,    Thank you for referring your patient, Argenis Aldana, to the [facility]. Please see a copy of my visit note below.    Patient: Argenis Aldana   (MRN [id number])   Encounter Date: 2017  Referring: Sheryl Leventhal, M.D. ([facility], [address] [place] [address], [phone], fax [phone])  Attending: Baudilio Avila M.D.     Chief Complaint/Reason for Referral: Second opinion regarding possible mast cell activation disease (MCAD).    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  At the beginning of the encounter, I reviewed all available chart data, consisting principally of about 200 pages of outside hardcopy medical records, the salient points of which I've incorporated into the narrative below.  The history here is a bit complex, and it's probably best to just present it all chronologically, blending HPI and PMH as follows.  Her history of multisystem polymorbidity of generally inflammatory and allergic themes is virtually lifelong, as she recalls problems dating to her earliest memories with constipation and unusual lethargy relative to her peers, followed around age 7 by onset of vaginal \"yeast infections\" which have frequently plagued her ever since (though often with curiously negative microbiologic findings).  Also around age 7 she began suffering episodes, usually post-prandial, of acute abdominal distention (\"like I'm suddenly pregnant\"), and these episodes, too, have continued through the rest of her life.  She recalls being taken by her father to the ER at least three times between 7 and 12 for this and each time being told, " "after an unrevealing evaluation, that the problem must be psychosomatic.  She knows she has been anemic ever since some point in childhood; she says no treatment has ever helped this.  She recalls that around age 9 problems with asthma began emerging.  Her lifelong problems with frequent episodes of sinusitis emerged around age 10.  Menarche came at 14 and was unremarkable, but around age 15, soon after she joined the swim team, she had to quit because chlorine exposure was causing rashes and sinusitis.  All of these problems continued, relatively stable, through the rest of adolescence and her 20s and 30s, including recurrent episodes of sinusitis and vaginal yeast infections.  Her first pregnancy came at 38 and proceeded smoothly.  Post-partum, she found herself perpetually exhausted and attributed this to the colicky baby as well as taking care of two young children that came to her marriage by her .  Due to thrush at the nipple and what sounds like a resulting painful candidal mastitis, she was unable to breast-feed.  She says the pregnancy was really the defining event in her life, as she has never again felt well since the pregnancy.  Since delivery she began experiencing frequent problems (every 1-3 months) with sinusitis, rashes, swelling of the extremities and tongue, alopecia, migraines, limited mobility and pain migrating about the bilateral neck/shoulders, and ongoing exhaustion.  She says six testings for celiac disease were negative, but then she went to a different specialist and underwent some sort of \"special\" blood and stool testing and was finally diagnosed with celiac disease.  She went gluten-free and saw essentially complete remission of sinusitis, yeast infections, anal sores, and neck/shoulder pain, but hand swelling and sores and alopecia continued.  She found \"high mold\" in her body, had the air ducts in her home cleaned in early '16, then found an elevated mercury level and " "underwent some sort of a detoxification process for this, too.  She also says she contract an Ehrlichia infection from a tick from the woods near her [place], but this was treated with antibiotics.  However, \"despite seeing 45 doctors,\" she continued to feel generally unwell.  In her own research, she determined that she might have MCAD, leading to the present evaluation.    On a full review of systems, although she denies any issues with anorexia, problems with ears or hearing, epistaxis, easy bleeding, intranasal sores, dysphagia, dyspnea, chest pain/discomfort, nauesa, vomiting, diarrhea, syncope, or mental health problems, she endorses a wide range of other, chronic/recurrent, episodic/intermittent and/or waxing/waning issues including subjective (rarely objective) fevers, flushing, feeling cold much of the time, fatigue (often to the point of exhaustion), malaise, headaches, diffusely migratory aching/pain, diffusely migratory pruritus, unprovoked soaking sweats, Raynaud's phenomenon, lactose intolerance, 8 pound weight loss on a salicylate-free diet recently, irritation of the eyes, acute brief inability to focus vision, easy bruising, sinonasal congestion, coryza, post-nasal drip, mouth sores, throat soreness, Jailyn-Parkinson-White (WPW) syndrome diagnosed at 47, occasional gastroesophageal reflux (during pregnancy and when off her salicylate-free diet), constipation since childhood, diffusely migratory abdominal discomfort including (usually post-prandial) bloating, urinary frequency and incontinence (curiously improved with her salicylate-free diet of late), diffusely migratory weakness, diffusely migratory edema (generally about the distal extremities), tingling (typically about the scalp), diffusely migratory bilateral cervical adenopathy and adenitis, occasional orthostatic and non-orthostatic presyncope in her 20s, cognitive dysfunction (particularly memory, concentration, and word-finding), hair loss, " "deterioration of dentition (and \"sensitivity\" to various toothpastes) despite good attention to dental hygiene, brittle nails, diffusely migratory rashes (typically patchy macular erythema), hives, insomnia, limited neck mobility, poor healing in general, and dry skin.  Complete ROS o/w neg.    Family history is notable for a son with celiac disease, WPW, lactose intolerance, and attention deficit hyperactivity disorder, a brother who survived prostate cancer, a father who  at 60 of prostate cancer, a paternal aunt who survived breast cancer at 60, and a mother who had skin cancer and who underwent a hysterectomy in her early 30s for unknown reasons.  The patient denies any history of smoking, significant alcohol use (in fact, she is intolerant of it, with one sip causing marked leg weakness and mouth sores), or illegal substance use.    She is on no regular medications and says she reacts adversely to essentially every medication she tries.  She lists her current allergies as abdominal distention, diarrhea, and gassiness to lactose, vomiting and migraines to monosodium glutamate, swelling and diffuse flushing to sulfa, and an unknown reaction in infancy to tetracycline.    Exam finds a trim, outwardly healthy appearing woman in no apparent distress, pleasant, cooperative, fully alert and oriented, easily independently ambulatory, presenting by herself.  Vitals per chart, utterly unremarkable (weight 114 pounds).  Key findings are her healthy, comfortable general appearance, HEENT benign but for a number of dental fillings, no plethora/pallor/diaphoresis/jaundice/rash/bleeding/bruising, neck supple, no JVD or thyromegaly or carotid bruits, no palpable adenopathy or tenderness at any of the usual node-bearing sites except for a single very tiny non-tender mobile rubbery shotty right inguinal node, clear lungs, regular heart (borderline bradycardic) with no adventitious sounds, abdomen benign, no peripheral edema " except for mild edema and erythema (mostly periungually, worse on the right hand than the left, which she said was her usual pattern) in the bilateral fingers, neuro grossly intact.  On a light scratch test on the upper back, mildly bright dermatographism (erythroderma only, no hives) quickly emerged and was only growing more intense when last checked 10 minutes later.    Review of available lab data was notable for mostly normal routine blood counts and chemistries (occasional minimal normocytic anemia, occasional minimal transaminitis) and elevated blood mercury and lead levels at one point around '12, though repeat testing more recently showed lower levels of both (in fact, the lead level had reduced to normal).  A wide range of other micronutrient testing was generally normal, as were thyroid function tests.  CK normal.  Ammonia normal.  C4a high at 2250 ng/ml (normal 0-650).  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  Except for a mildly elevated HDL, a lipid panel was normal.  The only mast cell  testing I could find was a normal tryptase and chromogranin A and a normal 24-hour urinary histamine.  The patient says that none of the blood or urine samples for mast cell  testing was ever kept chilled.    A/P: Dulces to the patient, because I think she's likely right in her suspicion that a mast cell activation disorder (MCAD) -- and far more likely the far more prevalent (if only recently recognized) type termed mast cell activation syndrome (MCAS) than the rare (but long recognized) type termed mastocytosis -- is at the root of most or all of her chronic multisystem polymorbidity of generally inflammatory and allergic themes. Beyond all the other diseases already ruled out by the great extent of testing she's undergone to date, I don't know of any other human disease that  "comes anywhere near as close as MCAS does to accounting, directly or indirectly, for the full range and chronicity of all the symptoms and findings here. (Of note, too, I'm not saying that any of her prior diagnoses are wrong (except for any assertions of psychosomatism that might have been made along the way). It's just that each of them accounts for only one subset or another of all that's gone on in her, while MCAS can account for most or all of everything that's been going on in her.) All of that said, she needs objective laboratory evidence of improperly behaving mast cells (note it's possible the results from the labs on her unchilled 24-hour urine sample might be false negatives), toward which end I ordered the range of testing I typically check in assessing for MCAS, namely, a CBCD, CMP, magnesium, PT/PTT, chilled serum tryptase and chromogranin A, chilled plasma prostaglandin D2 and histamine and heparin, chilled random urinary prostaglandin D2 and N-methylhistamine and leukotriene E4 and 2,3-dinor 11-beta-prostaglandin-F2-alpha, and chilled 24-hour urinary prostaglandin D2 and N-methylhistamine and leukotriene E4 and 2,3-dinor 11-beta-prostaglandin-F2-alpha. I also ordered an anti-IgE IgG and an anti-IgE receptor antibody, which won't be useful diagnostically but may influence certain therapeutic decisions down the road if MCAS is proven. I also ordered mercury and lead levels in blood and urine specimens (spot and 24-hour), and I ordered Ehrlichiosis serologies and DNA load by PCR.  In view of her chronic \"infections\" (which I suspect were often just flares of sterile inflammation), I also ordered a quantitative immunoglobulin profile.  We confirmed she has abstained from confounding use of proton pump inhibitors (PPIs) and non-steroidal anti-inflammatory drugs (NSAIDs) for the prior 5+ days. We provided her careful written and verbal instructions regarding the 24-hour urine collection including the " "importance of continuous chilling of the specimen throughout collection and transport.  She understands the various reasons why a single round of  testing might yield all negative results, and she understands that if this happens, it of course doesn't even begin to invalidate/refute/negate even a single element of her history (which strongly argues for a mast cell disorder). Her history is what it is, so if we get a negative round of testing, I'll simply recommend repeat testing (which she'll of course need to pursue locally), albeit with specimen collection at that point preferably deferred to a particularly symptomatic day. If 2-3 rounds of blood and urine testing yields all negative results, the \"backup plan\" will be to pursue a fresh set of bidirectional endoscopies to obtain biopsies throughout the luminal GI tract for pathologic evaluation specifically (using  staining at a minimum) for increased (>20/hpf) numbers of mast cells (as often seen mildly-moderately in MCAS, though not to anywhere near the degree (or patterns) seen in mastocytosis).    Note that all of the above having been said, at the end of the visit she still was not sure she wanted to pursue testing here given that her [place]-based insurance would not cover out-of-state testing.  She said she would think further about it.  I noted to her that most of this testing likely could be performed via (properly educated) laboratory facilities in [place] except for prostaglandin D2 testing, which I have heard is banned in [place] for reasons I have never been able to discover.    Thus, at the time we parted company, it still was not clear to me whether she would pursue testing here.  If testing is performed locally instead, I strongly recommend the ordering physician discuss the testing in advance with the manager of the accessioning lab.  Specimens should be collected in pre-chilled containers and kept continuously on ice, and a " refrigerated centrifuge should be used for all spins.  Specimens should be packed for transport in heavily insulated boxes with plenty of cold packs thrown in.  An express delivery service should be used to deliver the specimens to the performing laboratories as early the next day as possible.    Although I cautioned her that she doesn't have a definitive diagnosis of MCAS yet, we did engage in extended discussion today about general aspects of MCAS including its essential nature of chronic multisystem polymorbidity of a generally inflammatory theme, the availability of many therapies shown helpful in various MCAD/MCAS patients, the good likelihood of (eventually) finding therapy that will help her achieve the goal of feeling significantly better than the pre-treatment baseline the majority of the time, and the present frustrating absence of any biomarkers that can help predict which of the many available, potentially helpful therapies is most likely to benefit the individual patient. She understands that if we are able to secure a diagnosis of MCAS, she will be dependent on pursuing -- in patient, persistent, and methodical fashion, trying as hard as possible to make just one change at a time -- trials of the various therapies (which, again, lacking predictive biomarkers, we generally pursue in order of escalating cost). She understands that once all test results are available about a month from now, I will write an addendum to this note (to again be distributed to all of her physicians listed below) providing my interpretation of the results and recommendations for next steps.    I told her that the only therapy I am willing to empirically recommend in a patient with suspected, but not yet proven, MCAS is a full (H1 + H2) histamine receptor blockade (e.g., loratadine 10 mg bid + famotidine 20 mg bid).  She understands that some patients find that alternative H1 or H2 blockers serve them better, some patients find  "that tid dosing serves them better than bid dosing, and some patients find that slightly higher dosages (e.g., 20-30 mg rather than 10 mg of cetirizine, or 150 mg rather than 75 mg of ranitidine) serve them better than lower dosages. She will need to \"experiment,\" taking 2-4 weeks with each specific combination of drug/dose/frequency to understand what it can accomplish for her in controlling this disease of naturally waxing/waning intensity. I also cautioned her that MCAD/MCAS patients have quite a predilection to adversely react not necessarily to the active ingredients in their medications but rather to the excipients (fillers, binders, dyes, preservatives, etc.) in their medications. As such, if she experiences an adverse reaction very shortly after beginning an ordinarily well tolerated medication (as is certainly the case with the H1 and H2 blockers), excipient reactivity must be suspected and she should promptly work with her pharmacist to review the medication's ingredient list, try to identify the likely offending ingredient, and try one or more alternative formulations of the medication which don't share any of the offending formulation's excipients. She appears to understand.    She lives far too distantly from here to return with any significant frequency.  She understands she will remain 100% dependent on her local providers for management of all acute and chronic aspects of the disease.  I'll see her roughly annually for \"strategic reassessments.\"    As is usually the case with initial consultations for mast cell disease, this was a long encounter, 100 minutes in total, with 60 minutes spent in counseling. The patient indicated that all of her questions at this time had been answered to her satisfaction, and she expressed appreciation for the time I had given her.      Typed, reviewed, and electronically signed by: Baudilio Avila M.D.     DT: 01/18/2017 08:56 PM    cc: 1. Sheryl Leventhal, M.D. " ([facility], [address], [place] [address], [phone], fax [phone])  2. Please also mail a copy to the patient at her home address as listed in the system.    Again, thank you for allowing me to participate in the care of your patient.      Sincerely,    Baudilio Avila MD

## 2017-01-19 ENCOUNTER — CARE COORDINATION (OUTPATIENT)
Dept: ONCOLOGY | Facility: CLINIC | Age: 52
End: 2017-01-19

## 2017-01-19 NOTE — PROGRESS NOTES
1/19/17:  Spoke with pt reviewing did receive message from May about what labs could wait until she goes home.  Stated know some of usual type labs (CBC, CMP) could likely wait but not sure about some of the other lab work that Dr. Avila had ordered.  Have sent message to Dr. Avila though not sure he was in all day today.  Will try to reach him tomorrow morning if not received message.  Argenis appreciated call.

## 2017-01-20 DIAGNOSIS — T78.3XXS ANGIOEDEMA, SEQUELA: ICD-10-CM

## 2017-01-20 DIAGNOSIS — T56.0X1S: ICD-10-CM

## 2017-01-20 DIAGNOSIS — Z77.018 EXPOSURE TO MERCURY: ICD-10-CM

## 2017-01-20 DIAGNOSIS — J01.91 ACUTE RECURRENT SINUSITIS, UNSPECIFIED LOCATION: ICD-10-CM

## 2017-01-20 DIAGNOSIS — A69.20 LYME DISEASE: ICD-10-CM

## 2017-01-20 DIAGNOSIS — Z86.19: ICD-10-CM

## 2017-01-20 DIAGNOSIS — L50.1 CHRONIC IDIOPATHIC URTICARIA: ICD-10-CM

## 2017-01-20 LAB
ALBUMIN SERPL-MCNC: 4.1 G/DL (ref 3.4–5)
ALP SERPL-CCNC: 84 U/L (ref 40–150)
ALT SERPL W P-5'-P-CCNC: 24 U/L (ref 0–50)
ANION GAP SERPL CALCULATED.3IONS-SCNC: 7 MMOL/L (ref 3–14)
APTT PPP: 36 SEC (ref 22–37)
AST SERPL W P-5'-P-CCNC: 17 U/L (ref 0–45)
BASOPHILS # BLD AUTO: 0 10E9/L (ref 0–0.2)
BASOPHILS NFR BLD AUTO: 0.7 %
BILIRUB SERPL-MCNC: 1.1 MG/DL (ref 0.2–1.3)
BUN SERPL-MCNC: 14 MG/DL (ref 7–30)
CALCIUM SERPL-MCNC: 9.2 MG/DL (ref 8.5–10.1)
CHLORIDE SERPL-SCNC: 102 MMOL/L (ref 94–109)
CO2 SERPL-SCNC: 29 MMOL/L (ref 20–32)
CREAT SERPL-MCNC: 0.59 MG/DL (ref 0.52–1.04)
DIFFERENTIAL METHOD BLD: NORMAL
EOSINOPHIL # BLD AUTO: 0 10E9/L (ref 0–0.7)
EOSINOPHIL NFR BLD AUTO: 0.9 %
ERYTHROCYTE [DISTWIDTH] IN BLOOD BY AUTOMATED COUNT: 11.9 % (ref 10–15)
GFR SERPL CREATININE-BSD FRML MDRD: NORMAL ML/MIN/1.7M2
GLUCOSE SERPL-MCNC: 83 MG/DL (ref 70–99)
HCT VFR BLD AUTO: 38.1 % (ref 35–47)
HGB BLD-MCNC: 12.8 G/DL (ref 11.7–15.7)
IGA SERPL-MCNC: 179 MG/DL (ref 70–380)
IGG SERPL-MCNC: 1350 MG/DL (ref 695–1620)
IGM SERPL-MCNC: 134 MG/DL (ref 60–265)
IMM GRANULOCYTES # BLD: 0 10E9/L (ref 0–0.4)
IMM GRANULOCYTES NFR BLD: 0.2 %
INR PPP: 0.98 (ref 0.86–1.14)
LYMPHOCYTES # BLD AUTO: 1.5 10E9/L (ref 0.8–5.3)
LYMPHOCYTES NFR BLD AUTO: 33.3 %
MAGNESIUM SERPL-MCNC: 2.2 MG/DL (ref 1.6–2.3)
MCH RBC QN AUTO: 29.8 PG (ref 26.5–33)
MCHC RBC AUTO-ENTMCNC: 33.6 G/DL (ref 31.5–36.5)
MCV RBC AUTO: 89 FL (ref 78–100)
MISCELLANEOUS TEST: NORMAL
MONOCYTES # BLD AUTO: 0.2 10E9/L (ref 0–1.3)
MONOCYTES NFR BLD AUTO: 4.1 %
NEUTROPHILS # BLD AUTO: 2.7 10E9/L (ref 1.6–8.3)
NEUTROPHILS NFR BLD AUTO: 60.8 %
NRBC # BLD AUTO: 0 10*3/UL
NRBC BLD AUTO-RTO: 0 /100
PLATELET # BLD AUTO: 162 10E9/L (ref 150–450)
POTASSIUM SERPL-SCNC: 3.6 MMOL/L (ref 3.4–5.3)
PROT SERPL-MCNC: 8 G/DL (ref 6.8–8.8)
RBC # BLD AUTO: 4.3 10E12/L (ref 3.8–5.2)
SODIUM SERPL-SCNC: 139 MMOL/L (ref 133–144)
WBC # BLD AUTO: 4.4 10E9/L (ref 4–11)

## 2017-01-21 LAB
LEAD BLD-MCNC: 1.9 UG/DL (ref 0–4.9)
RESULT: NORMAL
SEND OUTS MISC TEST CODE: NORMAL
SEND OUTS MISC TEST SPECIMEN: NORMAL
SPECIMEN SOURCE: NORMAL
TEST NAME: NORMAL

## 2017-01-22 LAB
A PHAGOCYTOPH IGG TITR SER IF: NORMAL {TITER}
A PHAGOCYTOPH IGM TITR SER IF: NORMAL {TITER}
E CHAFFEENSIS IGG TITR SER: NORMAL {TITER}
E CHAFFEENSIS IGM TITR SER: NORMAL {TITER}
TRYPTASE SERPL-MCNC: 3.8 NG/ML

## 2017-01-23 LAB
A PHAGOCYTOPH DNA BLD QL NAA+PROBE: NOT DETECTED
COLLECT DURATION TIME UR: 27 H
COLLECT DURATION TIME UR: 27 H
COLLECT DURATION TIME UR: NORMAL H
COLLECT DURATION TIME UR: NORMAL H
CREAT 24H UR-MRATE: 729 G/(24.H)
CREAT 24H UR-MRATE: NORMAL G/(24.H)
CREAT SERPL-MCNC: 20 MG/DL
CREAT SERPL-MCNC: 25 MG/DL
CREAT SERPL-MCNC: 729 MG/DL
CREAT SERPL-MCNC: NORMAL MG/DL
CREAT UR-MCNC: 20 MG/DL
CREAT UR-MCNC: 25 MG/DL
E CHAFFEENSIS DNA BLD QL NAA+PROBE: NOT DETECTED
E EWINGII DNA SPEC QL NAA+PROBE: NOT DETECTED
EHRLICHIA DNA SPEC QL NAA+PROBE: NORMAL
IGE SERPL-ACNC: 6 KIU/L (ref 0–114)
LEAD 24H UR-MCNC: NORMAL NG/ML
LEAD 24H UR-MCNC: NORMAL NG/ML
LEAD 24H UR-MRATE: NORMAL
LEAD 24H UR-MRATE: NORMAL
LEAD UR-MCNC: NORMAL UG/L
LEAD UR-MCNC: NORMAL UG/L
MERCURY 24H UR-MCNC: NORMAL UG/L
MERCURY 24H UR-MCNC: NORMAL UG/L
MERCURY 24H UR-MRATE: NORMAL
MERCURY 24H UR-MRATE: NORMAL
MERCURY UR-MCNC: NORMAL UG/L
MERCURY UR-MCNC: NORMAL UG/L
SPECIMEN VOL ?TM UR: 3280 ML
SPECIMEN VOL ?TM UR: 3280 ML
SPECIMEN VOL ?TM UR: NORMAL ML
SPECIMEN VOL ?TM UR: NORMAL ML

## 2017-01-24 ENCOUNTER — CARE COORDINATION (OUTPATIENT)
Dept: ONCOLOGY | Facility: CLINIC | Age: 52
End: 2017-01-24

## 2017-01-24 LAB
2,3 11B PROSTAGLANDIN F2A UR: 2115
HISTAMINE SERPL-SCNC: NORMAL NMOL/L
MERCURY BLD-MCNC: 4 NG/ML

## 2017-01-24 NOTE — PROGRESS NOTES
"Reviewed lab results currently available with pt after reminding her that not all results are back at this time.  Reviewed that thus far, the mast cell  tests that are back are within \"normal\".  Reminded her rational for waiting until all results are back before Dr. Avila can make any determination on next step.  Also reminded her that negative results do not mean that she does not have a mast cell disease re: difficult to test for mast cell mediators.  Pt also had question about H1 anti-histamine.  She reports she took Claritin (brand) for 3 days and had reaction (for three days).  Reactions were itchy scalp and thighs.  She has had these reactions in past when she had too much histamine in diet.  Asking what step to take now as has Zyrtec in house but not tried.  Discussed that above reaction was likely related to excipient in the Claritin brand antihistamine.  Discussed if she wants to try the Zyrtec she has, she should first compare the labels of both Claritin and Zyrtec for different excipient formulation.  If not different (by at least one ingredient), likely should look for a different formulation of \"Claritin\".  Also discussed starting to keep notes on the excipients in the brand name Claritin.    Pt stated she understood information and appreciated call back.  "

## 2017-01-25 LAB
CGA SERPL-MCNC: 141 NG/ML
COLLECT DURATION TIME UR: 27 H
COLLECT DURATION TIME UR: ABNORMAL H
CREAT UR-MCNC: 20 MG/DL
CREAT UR-MCNC: 27 MG/DL
LEUKOTRIENE E4 URINE: NORMAL
LEUKOTRIENE E4 URINE: NORMAL
ME-HISTAMINE/CREAT 24H UR: 207
ME-HISTAMINE/CREAT 24H UR: 212
SPECIMEN VOL 24H UR: 3280 L
SPECIMEN VOL 24H UR: ABNORMAL L
UFH PPP CHRO-ACNC: <0.06 U/ML

## 2017-01-26 LAB — ANTI IGE ANTIBODY: NORMAL

## 2017-01-30 ENCOUNTER — CARE COORDINATION (OUTPATIENT)
Dept: ONCOLOGY | Facility: CLINIC | Age: 52
End: 2017-01-30

## 2017-01-30 NOTE — PROGRESS NOTES
Spoke with pt this afternoon.  She had left message this morning   Pt asking if her low histamine diet would have played a role in level of her test results meaning if results would have been higher if she was not on low histamine diet.  Also asked if Dr. Avila takes her being on low histamine diet into account when he is making his diagnosis.  Reviewed that at present it is her N-methylhistamine urine test that came back elevated and this is likely a different  then histamine.  Stated author does not know if histamine intake with food would effect this result but will send reminder to Dr. Avila that pt is on low histamine diet. Her histamine test was <8 with ref range being 0-8.    Overall, author stated need to wait until her prostaglandin D2 tests (blood, random urine and 24 hour urine) come back.  At that time Dr. Avila will review all test results to determine if she has diagnosis of mast cell activation syndrome.  At best, author can say that at present she has 3 elevated mediators so may have been worthwhile that she had testing done while in [place].  Pt appreciated call and information.  Pt also requested author copy and paste progress/consult note into [employer] message and send to her at this time.

## 2017-01-31 LAB
COLLECT DURATION TIME UR: 26.5 H
COLLECT DURATION TIME UR: NORMAL H
PROSTAGLANDIN D2 24H UR-MRATE: 138
PROSTAGLANDIN D2 24H UR-MRATE: NORMAL
PROSTAGLANDIN D2: 23
SPECIMEN VOL 24H UR: 3280 L
SPECIMEN VOL 24H UR: NORMAL L

## 2017-02-01 LAB — 2,3 11B PROSTAGLANDIN F2A UR: NORMAL

## 2017-02-07 LAB
CIU ASSOCIATED BASOPHIL ACTIVATION: 30
IGE RECEP AB COMMENT: ABNORMAL

## 2017-02-09 ENCOUNTER — CARE COORDINATION (OUTPATIENT)
Dept: ONCOLOGY | Facility: CLINIC | Age: 52
End: 2017-02-09

## 2017-02-09 NOTE — PROGRESS NOTES
Spoke with Argenis reviewing that at this point all lab results are back and now need to have Dr. Avila review to put results in context of what they mean for her.  Explained would send him message about results being available but also stated likely he will not have opportunity prior to weekend (if then) to write addendum.  Pt requested she receive copy of addended report via [employer] message again.  RN stated would try to send reminder to self.

## 2017-02-20 ENCOUNTER — MYC MEDICAL ADVICE (OUTPATIENT)
Dept: ONCOLOGY | Facility: CLINIC | Age: 52
End: 2017-02-20

## 2017-12-31 ENCOUNTER — HEALTH MAINTENANCE LETTER (OUTPATIENT)
Age: 52
End: 2017-12-31

## 2018-06-14 ENCOUNTER — HOSPITAL ENCOUNTER (EMERGENCY)
Dept: HOSPITAL 74 - FER | Age: 53
Discharge: HOME | End: 2018-06-14
Payer: COMMERCIAL

## 2018-06-14 VITALS — TEMPERATURE: 99.1 F | DIASTOLIC BLOOD PRESSURE: 89 MMHG | HEART RATE: 90 BPM | SYSTOLIC BLOOD PRESSURE: 122 MMHG

## 2018-06-14 VITALS — BODY MASS INDEX: 20 KG/M2
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
## 2018-06-14 DIAGNOSIS — S61.432A: Primary | ICD-10-CM
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
## 2018-06-14 PROCEDURE — 2W3DX1Z IMMOBILIZATION OF LEFT LOWER ARM USING SPLINT: ICD-10-PCS

## 2018-06-14 NOTE — PDOC
History of Present Illness





- General


History Source: Patient


Exam Limitations: No Limitations





- History of Present Illness


Initial Comments: 





06/14/18 14:52





The patient is a 53 year old female with no significant past medical history 

presents to the emergency department for evaluation of left hand s/p dog bite. 

The patient reports receiving a bite from another dog after trying to prevent 

her own dog from fighting with another owners dog. The patient reports a 

laceration on the dorsal side of her left hand with moderate pain and 

associated swelling. She also reports an abrasion on the palmar aspect of the 

left hand. She states her dog is currently enroute to the pet emergency room 

after receiving a bite from the other dog. The patient presents to the 

emergency department for evaluation directly after being bit. The patients 

immunizations are up to date (last tetanus on 02/2014). 





The patient denies shortness of breath, headache, and dizziness.


Denies fevers, chills, nausea, vomiting, diarrhea, and constipation.


Denies any other kinds of injury. 





Allergies: Sulfonamide antibiotics, Tetracyclines, Dairy, Gluten.


Past surgical history: Patient denies. 


Social history: No reported cigarette, alcohol, or drug use.








<Eva Headley - Last Filed: 06/14/18 14:52>





<Cruz Cain - Last Filed: 06/14/18 15:55>





- General


Chief Complaint: Bite


Stated Complaint: left hand dog bite


Time Seen by Provider: 06/14/18 14:06





Past History





<Eva Headley - Last Filed: 06/14/18 14:52>





- Past Medical History


COPD: No


DVT: No


Dementia: No





- Suicide/Smoking/Psychosocial Hx


Smoking History: Never smoked


Hx Alcohol Use: No


Drug/Substance Use Hx: No


Substance Use Type: None





<Cruz Cain - Last Filed: 06/14/18 15:55>





- Past Medical History


Allergies/Adverse Reactions: 


 Allergies











Allergy/AdvReac Type Severity Reaction Status Date / Time


 


gluten Allergy   Verified 06/14/18 13:53


 


Sulfa (Sulfonamide Allergy   Verified 06/14/18 13:53





Antibiotics)     


 


Tetracyclines Allergy   Verified 06/14/18 13:53


 


dairy Allergy   Uncoded 06/14/18 13:53











Home Medications: 


Ambulatory Orders





Amox-Tr/K Cl [Augmentin 875-125mg Tablet -] 1 tab PO Q12H #10 tablet 06/14/18 











**Review of Systems





- Review of Systems


Able to Perform ROS?: Yes


Comments:: 


CONSTITUTIONAL:


Absent: fever, no chills, no fatigue


EYES:


Absent: visual changes


ENT:


Absent: ear pain, no sore throat


CARDIOVASCULAR:


Absent: chest pain, no palpitations


RESPIRATORY:


Absent: cough, no SOB


GI:


Absent: abdominal pain, no nausea, no vomiting, no constipation, no diarrhea


GENITOURINARY:


Absent: dysuria, no frequency, no hematuria


MUSCULOSKELETAL: (+)Left hand laceration. (+)Left hand pain. (+)Left hand 

swelling. 


Absent: back pain, no arthralgia, no myalgia


SKIN:


Absent: rash


NEURO:


Absent: headache








<Eva Headley - Last Filed: 06/14/18 14:52>





*Physical Exam





- Vital Signs


 Last Vital Signs











Temp Pulse Resp BP Pulse Ox


 


 99.1 F   90   18   122/89   100 


 


 06/14/18 13:52  06/14/18 13:52  06/14/18 13:52  06/14/18 13:52  06/14/18 13:52














- Physical Exam


Comments: 


GENERAL: 


Well-appearing, well-nourished. No apparent distress. Cooperative. 


HEENT: 


Normocephalic, atraumatic. PERRL, EOM intact.


CARDIOVASCULAR: 


Normal S1, S2. Regular rate and rhythm.


PULMONARY: 


Clear to auscultation bilaterally.


ABDOMEN: 


Soft, non-distended, non-tender. 


EXTREMITIES: 


(+)1cm laceration/puncture on dorsum of left hand, over the midportion of third 

metacarpal. (+)Superficial laceration/abrasion on palmar aspect of left hand, 

mid pal, thenar eminence. No foreign bodies are visible or palpable. Sensation 

to all 5 digits is intact. Extensor function is preserved with full strength 

against resistance all 5 digits. Flexion of the PIP and DIP joints are 

preserved and strong against resistance. Capillary refill is intact. Otherwise 

Normal ROM in all four extremities


SKIN: 


Warm, dry.  No rash


NEUROLOGICAL: 


No focal neurological deficits.








<Eva Headley - Last Filed: 06/14/18 14:52>





- Vital Signs


 Last Vital Signs











Temp Pulse Resp BP Pulse Ox


 


 99.1 F   90   18   122/89   100 


 


 06/14/18 13:52  06/14/18 13:52  06/14/18 13:52  06/14/18 13:52  06/14/18 13:52














<Cruz Cain - Last Filed: 06/14/18 15:55>





ED Treatment Course





- Medications


Given in the ED: 


ED Medications














Discontinued Medications














Generic Name Dose Route Start Last Admin





  Trade Name Jorge  PRN Reason Stop Dose Admin


 


Amoxicillin/Clavulanate Potassium  1 tab  06/14/18 14:24  06/14/18 14:30





  Augmentin - 875mg Tablet  PO  06/14/18 14:25  1 tab





  ONCE ONE   Administration





     





     





     





     














<Eva Headley - Last Filed: 06/14/18 14:52>





Medical Decision Making





- Medical Decision Making





06/14/18 14:36





Patient is a healthy female, specifically denies diabetes








Physical exam: Alert and oriented well-developed well-nourished no acute 

distress cooperative


Left hand: There is a 1 cm laceration/puncture of the dorsum of the hand, over 

the midportion of the third metacarpal. There is also a superficial laceration/

abrasion over the palmar aspect of the hand, mid palm, thenar eminence. No 

foreign bodies are visible or palpable. Sensation to all 5 digits is intact to 

2. discrimination. Extensor function is preserved with full strength against 

resistance all 5 digits. Flexion of the PIP and DIP joints are preserved and 

strong against resistance. Capillary refill is intact





Impression: Dog bite, puncture wound, rule out foreign body, no tendon or nerve 

damage is apparent





Plan: X-ray, immobilization, antibiotics, and follow-up with hand specialist.


06/14/18 15:53


X-rays negative for foreign body





Wounds copiously irrigated and scrubbed with normal saline. Dressed with 

bacitracin. Volar splint was applied and a sling for immobilization. 

Instructions for wound care  immobilization and immediate follow-up if sign of 

infection. Discharged fully ambulatory in no significant pain or other distress 

to follow-up as directed





<Cruz Cain - Last Filed: 06/14/18 15:55>





*DC/Admit/Observation/Transfer





- Attestations


Scribe Attestion: 


Documentation prepared by Eva Headley, acting as medical scribe for Cruz Cain MD.








<Eva Headley - Last Filed: 06/14/18 14:52>





- Discharge Dispostion


Decision to Admit order: No





<Cruz Cain - Last Filed: 06/14/18 15:55>


Diagnosis at time of Disposition: 


Puncture wound, hand


Qualifiers:


 Encounter type: initial encounter Foreign body presence: without foreign body 

Laterality: left Qualified Code(s): S61.432A - Puncture wound without foreign 

body of left hand, initial encounter








- Discharge Dispostion


Disposition: HOME


Condition at time of disposition: Improved





- Prescriptions


Prescriptions: 


Amox-Tr/K Cl [Augmentin 875-125mg Tablet -] 1 tab PO Q12H #10 tablet





- Referrals


Referrals: 


Colt Hays MD [Staff Physician] - 24 hours





- Patient Instructions


Printed Discharge Instructions:  How to Care for a Domestic Animal Bite


Additional Instructions: 


Rest and elevate. Warm compresses. Sling for immobilization. The more immobile, 

the less risk of infection.





Antibiotic as directed





If pain swelling redness or drainage occurs, return to ER immediately or see 

hand specialist for further evaluation and treatment. Hand infections develop 

rapidly and may cause severe damage and disability due to tendon and nerve 

damage.

## 2020-03-10 ENCOUNTER — HEALTH MAINTENANCE LETTER (OUTPATIENT)
Age: 55
End: 2020-03-10

## 2020-12-27 ENCOUNTER — HEALTH MAINTENANCE LETTER (OUTPATIENT)
Age: 55
End: 2020-12-27

## 2021-04-24 ENCOUNTER — HEALTH MAINTENANCE LETTER (OUTPATIENT)
Age: 56
End: 2021-04-24

## 2021-09-06 ENCOUNTER — TRANSCRIPTION ENCOUNTER (OUTPATIENT)
Age: 56
End: 2021-09-06

## 2021-10-09 ENCOUNTER — HEALTH MAINTENANCE LETTER (OUTPATIENT)
Age: 56
End: 2021-10-09

## 2022-03-20 ENCOUNTER — HEALTH MAINTENANCE LETTER (OUTPATIENT)
Age: 57
End: 2022-03-20

## 2022-05-16 ENCOUNTER — HEALTH MAINTENANCE LETTER (OUTPATIENT)
Age: 57
End: 2022-05-16

## 2022-09-11 ENCOUNTER — HEALTH MAINTENANCE LETTER (OUTPATIENT)
Age: 57
End: 2022-09-11

## 2023-06-03 ENCOUNTER — HEALTH MAINTENANCE LETTER (OUTPATIENT)
Age: 58
End: 2023-06-03

## 2023-07-07 PROBLEM — Z00.00 ENCOUNTER FOR PREVENTIVE HEALTH EXAMINATION: Status: ACTIVE | Noted: 2023-07-07

## 2023-08-09 ENCOUNTER — NON-APPOINTMENT (OUTPATIENT)
Age: 58
End: 2023-08-09

## 2023-08-09 DIAGNOSIS — Z78.9 OTHER SPECIFIED HEALTH STATUS: ICD-10-CM

## 2023-08-09 DIAGNOSIS — K64.8 OTHER HEMORRHOIDS: ICD-10-CM

## 2023-08-09 DIAGNOSIS — Z12.11 ENCOUNTER FOR SCREENING FOR MALIGNANT NEOPLASM OF COLON: ICD-10-CM

## 2023-08-09 NOTE — ASSESSMENT
[FreeTextEntry1] :      The patient has   NOT COME    to the VISIT   This Assessment  is  in a note Titled:  Non - Appointment    which  only reflects a summary and review of records The Assessment below is tentative and preliminary.  It is based only on information gathered from chart review and will need modification once the History is taken from the patient and the patient is examined.    1.          2. Hemorrhoids:  well - controlled.  No pain,  swelling,  itch,  bleeding * Discussed the  potential complications of thrombosis,  pain,  infection,  swelling, itching,  bleeding --none recently Recommendations:  * Moderate- High  Fiber Diet was reviewed and emphasized * 6  --  8 cups of decaffeinated fluid daily was emphasized  * Sitz Bathes as needed ,  No:  Anusol HC  Suppos / Cream  OK BID -- was needed * No:  Tucks BID,  Balneol Lotion,   Calmoseptine Oint -- was needed ;    can use  Prep H prn * No:  need for  Colorectal surgical evaluation for possible ablation         3. Colorectal   Neoplasia  Screening:  to be evaluated   Utilizing teaching posters and anatomical models the following were discussed and emphasized with the patient in detail: * Discussed the pre-malignant potential of polyps * Discussed the importance of f/u surveillance / screening colonoscopy * moderate-High  Fiber Diet was reviewed and emphasized * Anti-oxidants and ASA/NSAID Therapy emphasized * Given age > 40-51 yo * Recommend Colonoscopy  to  R/O  Colonic Neoplasia-- in 2023        Informed Consent: * The risks & Benefits of  Colonoscopy were discussed w patient. * This included but was not limited to perforation, bleeding, sedation /med rxns, missed lesions possibly requiring surgery, blood transfusions, antibiotics & CPR/Intubation. * Pt. understands & agrees to the procedures. The following instructions in regards to the prep and medically essential ( cardiac, pulmonary, sz, psych, endocrine)  pre-op medication administration was reviewed and emphasized with the patient .  * Pt. advised to D/C  ASA/NSAIDs  7  Days  PTP. * [ +++ ]  Dulcolax / Miralax / Mag. Citrate ,  [     ] Prepopik/ Clenpiq ,  [     ] Osmo Prep,  [    ] GoLytely,  prep. reviewed w Pt. * Hold  [           ] AM of procedure. * Hold  [           ] PM  before procedure. * Take  [           ] PM  before procedure. * Take  [           ] AM of procedure.

## 2023-08-09 NOTE — HISTORY OF PRESENT ILLNESS
[FreeTextEntry1] :       The patient   DID NOT COME   for the visit.  This HPI is  in a note Titled:  Non - Appointment   and reflects a summary and review of records : including previous and most recent  Labs, body imaging, consults and progress notes, operative and pathology reports, EKG reports, ED records, found in [facility], [facility],  [facility] and any additional records brought in by  the patient at the time of the visit.  It is for History taking purposes  PCP:  57 yo F w h/o    8/24/23    Today:  Feeling well, no c/o , CP, SOB/ LUA, Cough, Wheeze, Palpitations, edema  8/23/23   Today:   * Abd pain-->no * Nausea--> no * Vomit--> no * Early satiety--> no * Belching--> no * Hiccups--> no * Regurgitation--> no * Acid Taste / Water Brash--> no * Ht burn--> no * Dysphagia--> no * Throat Clearing--> no * Hoarseness--> no * Post-Nasal Drip--> no * Congestion--> no * Globus--> no * Cough--> no * Wheeze / PC-> -no * BMs: # 4 qd * Constipation--> no * Diarrhea--> no * Bloating--> no * Strain on Defecation--> no * Incompl Evac--> no * Flatulence--> no * Gurgling--> no * Melena--> no * BPBPR-> -no * Anorexia--> no * Wt. Loss--> no

## 2023-08-09 NOTE — REVIEW OF SYSTEMS
[Scleral Icterus (Yellow Eyes)] : no scleral icterus [As Noted in HPI] : as noted in HPI [Skin Lesions] : no skin lesions [Confused] : no confusion [Proptosis] : no proptosis [Easy Bruising] : no tendency for easy bruising [Negative] : Respiratory

## 2023-08-24 ENCOUNTER — APPOINTMENT (OUTPATIENT)
Dept: GASTROENTEROLOGY | Facility: CLINIC | Age: 58
End: 2023-08-24

## 2025-05-26 ENCOUNTER — TRANSCRIPTION ENCOUNTER (OUTPATIENT)
Age: 60
End: 2025-05-26